# Patient Record
Sex: FEMALE | Race: WHITE | NOT HISPANIC OR LATINO | Employment: FULL TIME | URBAN - METROPOLITAN AREA
[De-identification: names, ages, dates, MRNs, and addresses within clinical notes are randomized per-mention and may not be internally consistent; named-entity substitution may affect disease eponyms.]

---

## 2017-01-05 ENCOUNTER — OFFICE VISIT (OUTPATIENT)
Dept: NEUROLOGY | Facility: CLINIC | Age: 62
End: 2017-01-05
Attending: PSYCHIATRY & NEUROLOGY
Payer: COMMERCIAL

## 2017-01-05 VITALS
BODY MASS INDEX: 20.66 KG/M2 | HEART RATE: 69 BPM | DIASTOLIC BLOOD PRESSURE: 80 MMHG | HEIGHT: 65 IN | WEIGHT: 124 LBS | SYSTOLIC BLOOD PRESSURE: 120 MMHG

## 2017-01-05 DIAGNOSIS — G35 MS (MULTIPLE SCLEROSIS) (H): Primary | ICD-10-CM

## 2017-01-05 PROCEDURE — 99212 OFFICE O/P EST SF 10 MIN: CPT | Mod: ZF

## 2017-01-05 ASSESSMENT — PAIN SCALES - GENERAL: PAINLEVEL: NO PAIN (0)

## 2017-01-05 NOTE — Clinical Note
1/5/2017      RE: Sejal Barroso  [address]  [place] [address]       Referral source: Established patient    Chief complaint: Multiple sclerosis    History of the Present Illness: MsRayshawn Barroso is a 61 year old woman who returns to the Multiple Sclerosis Clinic today for regularly scheduled follow-up after earlier MRI scans of the brain and cervical spine.    Her history is as per my previous notes. She initially came to attention after an episode of partial transverse myelitis associated with an enhancing lesion in the lower thoracic cord in 2008. Diagnosis of multiple sclerosis was confirmed after accumulation of new radiologic lesions on MRI in 2009. She had probable right trigeminal neuralgia in 2010, and most recently developed lancinating pain in the left side of the neck in 2015. These pain syndromes have been self-limited, and she has never been on disease modifying therapy for MS.    Today she relates that after our most recent visit in October 2015, she did have some recurrence of very brief paroxysms of intense pain (<5 seconds) in the left side of the neck. Currently this has improved, although she does still have some mild episodes of discomfort in this location.    Otherwise, she denies any new episodic changes in vision, balance, strength or sensation suggestive of new relapse of MS since she was last seen.     I reviewed the images from MRI scans of the brain and cervical spine performed on October 17, 2016 in the presence of the patient. Cervical MRI images are moderately degraded by motion artifact. There is no abnormal enhancement or definite cord signal abnormality of the cord parenchyma. Brain MRI demonstrates multiple periventricular and juxtacortical foci of T2 hyperintensity in the deep white matter of the cerebral hemispheres bilaterally. In comparison to previous MRI of 10-, there is new enhancement adjacent to the posterior aspect of the left lateral  ventricle, likely with some subtle enlargement of pre-existing T2 hyperintense focus in this location. No other significant changes are seen.    Physical examination:  Vitals: Blood pressure 120/80; pulse 69; height 1.65 mg; weight 56.2 kg  General: Well nourished woman who presents to the evaluation alone, awake and alert and in no acute distress.    Assessment/plan:    1. Multiple sclerosis  I discussed the implications of the MRI findings with the patient at length. The presence of an enhancing lesion suggests an active inflammatory demyelinating plaque at the time the study was completed. This likely correlates with an ongoing risk of clinical relapse of MS, although given the relative paucity of relapses since onset of her symptoms, it is not really possible to quantify the magnitude of this risk. I told her that initiating disease modifying therapy would likely reduce the risk of relapse, but it is also possible that she will not have additional relapses even if she does not start treatment.    I discussed several options for disease modifying therapy with the patient. Glatiramer acetate has the advantage of a very benign side effect profile and with the new 40 mg formulation, injection frequency is reduced to three times per week. As injection frequency has been a concern in the past, we could also consider the Plegridy formulation of interferon beta, which requires an injection every two weeks. Laboratory monitoring of liver function tests, blood counts, and thyroid function is required with this medication, and abnormalities (most commonly elevated liver function tests) are occasionally limiting to use. Flu-like side efffects (e.g., myalgias, headache and low grade fever) are common but typically improve with continued use and usually can be successfully mitigated by giving the injections at night and pre-medicating with naproxen.    Oral agents can also be considered. Dimethyl fumarate and fingolimod have  rarely been associated with serious opportunistic infections of the brain, including progressive multifocal leukoencephalopathy (PML). Teriflunomide has not been associated with such complications to date, but can be associated with abnormal liver function tests, peripheral neuropathy, nausea and hair thinning.    I have provided the patient with information regarding several products (Aubagio, Copaxone and Plegridy); she will consider this and let us know if she is desiring to start treatment. However, I also told her that as her functional status has remained stable for several years with a watchful waiting strategy, I do not think it unreasonable to continue this approach either.    She remains on vitamin D at 4000 units daily, and should continue this.     I will see plan to see her back in clinic in about 10 months with MRI scans of the brain and cervical spine prior to that appointment.    I spent 47 minutes with the patient in the office today, with greater than 50% of this time spent in counseling.    Marcelino Salazar MD   of Neurology  [facility]    Cc:  Sami Bishop MD (PCP)  Patient

## 2017-01-05 NOTE — PATIENT INSTRUCTIONS
1. We will provide you with information regarding several disease modifying treatments for MS: Copaxone, Plegridy, and Aubagio    2. Return to clinic in about 10 months with MRI scans of the brain and cervical spine prior to the appointment

## 2017-01-05 NOTE — MR AVS SNAPSHOT
After Visit Summary   1/5/2017    Sejal Barroso    MRN: [id number]           Patient Information     Date Of Birth          1955        Visit Information        Provider Department      1/5/2017 10:00 AM Marcelino Salazar MD [facility]        Today's Diagnoses     MS (multiple sclerosis) (H)    -  1       Care Instructions    1. We will provide you with information regarding several disease modifying treatments for MS: Copaxone, Plegridy, and Aubagio    2. Return to clinic in about 10 months with MRI scans of the brain and cervical spine prior to the appointment        Follow-ups after your visit        Your next 10 appointments already scheduled     Nov 02, 2017  9:00 AM   MR CERVICAL SPINE W/O & W CONTRAST with SHMRP1   [facility] ([facility])    [address] [place] [address] [phone]           Take your medicines as usual, unless your doctor tells you not to. Bring a list of your current medicines to your exam (including vitamins, minerals and over-the-counter drugs).  You will be given intravenous contrast for this exam. To prepare:   The day before your exam, drink extra fluids at least six 8-ounce glasses (unless your doctor tells you to restrict your fluids).   Have a blood test (creatinine test) within 30 days of your exam. Go to your clinic or Diagnostic Imaging Department for this test.  The MRI machine uses a strong magnet. Please wear clothes without metal (snaps, zippers). A sweatsuit works well, or we may give you a hospital gown.  Please remove any body piercings and hair extensions before you arrive. You will also remove watches, jewelry, hairpins, wallets, dentures, partial dental plates and hearing aids. You may wear contact lenses, and you may be able to wear your rings. We have a safe place to keep your personal items, but it is safer to leave them at home.   **IMPORTANT** THE INSTRUCTIONS BELOW ARE  ONLY FOR THOSE PATIENTS WHO HAVE BEEN TOLD THEY WILL RECEIVE SEDATION OR GENERAL ANESTHESIA DURING THEIR MRI PROCEDURE:  IF YOU WILL RECEIVE SEDATION (take medicine to help you relax during your exam):   You must get the medicine from your doctor before you arrive. Bring the medicine to the exam. Do not take it at home.   Arrive one hour early. Bring someone who can take you home after the test. Your medicine will make you sleepy. After the exam, you may not drive, take a bus or take a taxi by yourself.   No eating 8 hours before your exam. You may have clear liquids up until 4 hours before your exam. (Clear liquids include water, clear tea, black coffee and fruit juice without pulp.)  IF YOU WILL RECEIVE ANESTHESIA (be asleep for your exam):   Arrive 1 1/2 hours early. Bring someone who can take you home after the test. You may not drive, take a bus or take a taxi by yourself.   No eating 8 hours before your exam. You may have clear liquids up until 4 hours before your exam. (Clear liquids include water, clear tea, black coffee and fruit juice without pulp.)  Please call the Imaging Department at your exam site with any questions.            Nov 02, 2017 10:00 AM   MR BRAIN W/O & W CONTRAST with SHMRP1   [facility]    [address] [place] [address] [phone]           Take your medicines as usual, unless your doctor tells you not to. Bring a list of your current medicines to your exam (including vitamins, minerals and over-the-counter drugs).  You will be given intravenous contrast for this exam. To prepare:   The day before your exam, drink extra fluids at least six 8-ounce glasses (unless your doctor tells you to restrict your fluids).   Have a blood test (creatinine test) within 30 days of your exam. Go to your clinic or Diagnostic Imaging Department for this test.  The MRI machine uses a strong magnet. Please wear clothes without metal (snaps,  zippers). A sweatsuit works well, or we may give you a hospital gown.  Please remove any body piercings and hair extensions before you arrive. You will also remove watches, jewelry, hairpins, wallets, dentures, partial dental plates and hearing aids. You may wear contact lenses, and you may be able to wear your rings. We have a safe place to keep your personal items, but it is safer to leave them at home.   **IMPORTANT** THE INSTRUCTIONS BELOW ARE ONLY FOR THOSE PATIENTS WHO HAVE BEEN TOLD THEY WILL RECEIVE SEDATION OR GENERAL ANESTHESIA DURING THEIR MRI PROCEDURE:  IF YOU WILL RECEIVE SEDATION (take medicine to help you relax during your exam):   You must get the medicine from your doctor before you arrive. Bring the medicine to the exam. Do not take it at home.   Arrive one hour early. Bring someone who can take you home after the test. Your medicine will make you sleepy. After the exam, you may not drive, take a bus or take a taxi by yourself.   No eating 8 hours before your exam. You may have clear liquids up until 4 hours before your exam. (Clear liquids include water, clear tea, black coffee and fruit juice without pulp.)  IF YOU WILL RECEIVE ANESTHESIA (be asleep for your exam):   Arrive 1 1/2 hours early. Bring someone who can take you home after the test. You may not drive, take a bus or take a taxi by yourself.   No eating 8 hours before your exam. You may have clear liquids up until 4 hours before your exam. (Clear liquids include water, clear tea, black coffee and fruit juice without pulp.)  Please call the Imaging Department at your exam site with any questions.            Nov 09, 2017  9:30 AM   (Arrive by 9:15 AM)   Return Multiple Sclerosis with Marcelino Salazar MD   [facility] ([facility])    [address] [facility]  [address] [facility] [address] [phone]              Future tests that were ordered for you today     Open Future Orders         "Priority Expected Expires Ordered    MRI Brain w & w/o contrast Routine 10/12/2017 3/31/2018 1/5/2017    MRI Cervical spine w & w/o contrast Routine 10/12/2017 3/31/2018 1/5/2017            Who to contact     If you have questions or need follow up information about today's clinic visit or your schedule please contact [facility] directly at [phone].  Normal or non-critical lab and imaging results will be communicated to you by Fliptophart, letter or phone within 4 business days after the clinic has received the results. If you do not hear from us within 7 days, please contact the clinic through Client24 or phone. If you have a critical or abnormal lab result, we will notify you by phone as soon as possible.  Submit refill requests through Client24 or call your pharmacy and they will forward the refill request to us. Please allow 3 business days for your refill to be completed.          Additional Information About Your Visit        Client24 Information     Client24 gives you secure access to your electronic health record. If you see a primary care provider, you can also send messages to your care team and make appointments. If you have questions, please call your primary care clinic.  If you do not have a primary care provider, please call [phone] and they will assist you.        Care EveryWhere ID     This is your Care EveryWhere ID. This could be used by other organizations to access your [facility] medical records  [id number]        Your Vitals Were     Pulse Height BMI (Body Mass Index)             69 1.651 m (5' 5\") 20.63 kg/m2          Blood Pressure from Last 3 Encounters:   01/05/17 120/80   10/15/15 126/74   08/24/15 124/79    Weight from Last 3 Encounters:   01/05/17 56.246 kg (124 lb)   08/24/15 55.792 kg (123 lb)               Primary Care Provider Office Phone # Fax #    Sami Bishop [phone]       [facility] [address]   [place] " [phone]        Thank you!     Thank you for choosing [facility]  for your care. Our goal is always to provide you with excellent care. Hearing back from our patients is one way we can continue to improve our services. Please take a few minutes to complete the written survey that you may receive in the mail after your visit with us. Thank you!             Your Updated Medication List - Protect others around you: Learn how to safely use, store and throw away your medicines at www.disposemymeds.org.          This list is accurate as of: 1/5/17 11:36 AM.  Always use your most recent med list.                   Brand Name Dispense Instructions for use    calcium carbonate 500 MG tablet    OS-TRICIA 500 mg [place]. [place]     Take 500 mg by mouth 2 times daily       MULTIVITAMIN & MINERAL PO          VITAMIN D (CHOLECALCIFEROL) PO      Take 800 Units by mouth 2 times daily

## 2017-01-07 NOTE — PROGRESS NOTES
Referral source: Established patient    Chief complaint: Multiple sclerosis    History of the Present Illness: Ms. Sejal Barroso is a 61 year old woman who returns to the Multiple Sclerosis Clinic today for regularly scheduled follow-up after earlier MRI scans of the brain and cervical spine.    Her history is as per my previous notes. She initially came to attention after an episode of partial transverse myelitis associated with an enhancing lesion in the lower thoracic cord in 2008. Diagnosis of multiple sclerosis was confirmed after accumulation of new radiologic lesions on MRI in 2009. She had probable right trigeminal neuralgia in 2010, and most recently developed lancinating pain in the left side of the neck in 2015. These pain syndromes have been self-limited, and she has never been on disease modifying therapy for MS.    Today she relates that after our most recent visit in October 2015, she did have some recurrence of very brief paroxysms of intense pain (<5 seconds) in the left side of the neck. Currently this has improved, although she does still have some mild episodes of discomfort in this location.    Otherwise, she denies any new episodic changes in vision, balance, strength or sensation suggestive of new relapse of MS since she was last seen.     I reviewed the images from MRI scans of the brain and cervical spine performed on October 17, 2016 in the presence of the patient. Cervical MRI images are moderately degraded by motion artifact. There is no abnormal enhancement or definite cord signal abnormality of the cord parenchyma. Brain MRI demonstrates multiple periventricular and juxtacortical foci of T2 hyperintensity in the deep white matter of the cerebral hemispheres bilaterally. In comparison to previous MRI of 10-, there is new enhancement adjacent to the posterior aspect of the left lateral ventricle, likely with some subtle enlargement of pre-existing T2 hyperintense focus in this  location. No other significant changes are seen.    Physical examination:  Vitals: Blood pressure 120/80; pulse 69; height 1.65 mg; weight 56.2 kg  General: Well nourished woman who presents to the evaluation alone, awake and alert and in no acute distress.    Assessment/plan:    1. Multiple sclerosis  I discussed the implications of the MRI findings with the patient at length. The presence of an enhancing lesion suggests an active inflammatory demyelinating plaque at the time the study was completed. This likely correlates with an ongoing risk of clinical relapse of MS, although given the relative paucity of relapses since onset of her symptoms, it is not really possible to quantify the magnitude of this risk. I told her that initiating disease modifying therapy would likely reduce the risk of relapse, but it is also possible that she will not have additional relapses even if she does not start treatment.    I discussed several options for disease modifying therapy with the patient. Glatiramer acetate has the advantage of a very benign side effect profile and with the new 40 mg formulation, injection frequency is reduced to three times per week. As injection frequency has been a concern in the past, we could also consider the Plegridy formulation of interferon beta, which requires an injection every two weeks. Laboratory monitoring of liver function tests, blood counts, and thyroid function is required with this medication, and abnormalities (most commonly elevated liver function tests) are occasionally limiting to use. Flu-like side efffects (e.g., myalgias, headache and low grade fever) are common but typically improve with continued use and usually can be successfully mitigated by giving the injections at night and pre-medicating with naproxen.    Oral agents can also be considered. Dimethyl fumarate and fingolimod have rarely been associated with serious opportunistic infections of the brain, including  progressive multifocal leukoencephalopathy (PML). Teriflunomide has not been associated with such complications to date, but can be associated with abnormal liver function tests, peripheral neuropathy, nausea and hair thinning.    I have provided the patient with information regarding several products (Aubagio, Copaxone and Plegridy); she will consider this and let us know if she is desiring to start treatment. However, I also told her that as her functional status has remained stable for several years with a watchful waiting strategy, I do not think it unreasonable to continue this approach either.    She remains on vitamin D at 4000 units daily, and should continue this.     I will see plan to see her back in clinic in about 10 months with MRI scans of the brain and cervical spine prior to that appointment.    I spent 47 minutes with the patient in the office today, with greater than 50% of this time spent in counseling.

## 2017-07-01 ENCOUNTER — HEALTH MAINTENANCE LETTER (OUTPATIENT)
Age: 62
End: 2017-07-01

## 2017-11-02 ENCOUNTER — HOSPITAL ENCOUNTER (OUTPATIENT)
Dept: MRI IMAGING | Facility: CLINIC | Age: 62
Discharge: HOME | End: 2017-11-02
Attending: PSYCHIATRY & NEUROLOGY | Admitting: PSYCHIATRY & NEUROLOGY
Payer: COMMERCIAL

## 2017-11-02 ENCOUNTER — HOSPITAL ENCOUNTER (OUTPATIENT)
Dept: MRI IMAGING | Facility: CLINIC | Age: 62
End: 2017-11-02
Attending: PSYCHIATRY & NEUROLOGY
Payer: COMMERCIAL

## 2017-11-02 DIAGNOSIS — G35 MS (MULTIPLE SCLEROSIS) (H): ICD-10-CM

## 2017-11-02 PROCEDURE — 70553 MRI BRAIN STEM W/O & W/DYE: CPT

## 2017-11-02 PROCEDURE — 25000128 H RX IP 250 OP 636: Performed by: PSYCHIATRY & NEUROLOGY

## 2017-11-02 PROCEDURE — A9585 GADOBUTROL INJECTION: HCPCS | Performed by: PSYCHIATRY & NEUROLOGY

## 2017-11-02 PROCEDURE — 72156 MRI NECK SPINE W/O & W/DYE: CPT

## 2017-11-02 RX ORDER — GADOBUTROL 604.72 MG/ML
6 INJECTION INTRAVENOUS ONCE
Status: COMPLETED | OUTPATIENT
Start: 2017-11-02 | End: 2017-11-02

## 2017-11-02 RX ADMIN — GADOBUTROL 6 ML: 604.72 INJECTION INTRAVENOUS at 10:34

## 2017-11-09 ENCOUNTER — OFFICE VISIT (OUTPATIENT)
Dept: NEUROLOGY | Facility: CLINIC | Age: 62
End: 2017-11-09
Attending: PSYCHIATRY & NEUROLOGY
Payer: COMMERCIAL

## 2017-11-09 VITALS
WEIGHT: 128 LBS | HEART RATE: 68 BPM | SYSTOLIC BLOOD PRESSURE: 116 MMHG | DIASTOLIC BLOOD PRESSURE: 67 MMHG | BODY MASS INDEX: 21.33 KG/M2 | HEIGHT: 65 IN

## 2017-11-09 DIAGNOSIS — G35 MULTIPLE SCLEROSIS (H): ICD-10-CM

## 2017-11-09 DIAGNOSIS — G35 MULTIPLE SCLEROSIS (H): Primary | ICD-10-CM

## 2017-11-09 DIAGNOSIS — R26.9 GAIT DISTURBANCE: ICD-10-CM

## 2017-11-09 PROCEDURE — 40000809 ZZH STATISTIC NO DOCUMENTATION TO SUPPORT CHARGE

## 2017-11-09 PROCEDURE — 82306 VITAMIN D 25 HYDROXY: CPT | Performed by: PSYCHIATRY & NEUROLOGY

## 2017-11-09 PROCEDURE — 36415 COLL VENOUS BLD VENIPUNCTURE: CPT | Performed by: PSYCHIATRY & NEUROLOGY

## 2017-11-09 ASSESSMENT — PAIN SCALES - GENERAL: PAINLEVEL: MILD PAIN (2)

## 2017-11-09 NOTE — MR AVS SNAPSHOT
After Visit Summary   11/9/2017    Sejal Barroso    MRN: [id number]           Patient Information     Date Of Birth          1955        Visit Information        Provider Department      11/9/2017 9:30 AM Marcelino Salazar MD [facility]        Today's Diagnoses     Multiple sclerosis (H)    -  1      Care Instructions    1. Blood test (vitamin D level) today    2. Return to clinic in one year          Follow-ups after your visit        Follow-up notes from your care team     Return in about 1 year (around 11/9/2018).      Your next 10 appointments already scheduled     Nov 09, 2017 11:45 AM CST   LAB with  LAB   [facility] ([facility])    [address] [place] [address] [phone]           Please do not eat 10-12 hours before your appointment if you are coming in fasting for labs on lipids, cholesterol, or glucose (sugar). This does not apply to pregnant women. Water, hot tea and black coffee (with nothing added) are okay. Do not drink other fluids, diet soda or chew gum.            Nov 08, 2018  9:00 AM CST   (Arrive by 8:45 AM)   Return Multiple Sclerosis with Marcelino Salazar MD   [facility] ([facility])    [address] [place] [address] [phone]              Future tests that were ordered for you today     Open Future Orders        Priority Expected Expires Ordered    Vitamin D Deficiency Screening Routine 11/9/2017 1/25/2018 11/9/2017            Who to contact     If you have questions or need follow up information about today's clinic visit or your schedule please contact [facility] directly at [phone].  Normal or non-critical lab and imaging results will be communicated to you by MyChart, letter or phone within 4 business days after the clinic has received the results. If you do not hear from us within 7  "days, please contact the clinic through Verdezyne or phone. If you have a critical or abnormal lab result, we will notify you by phone as soon as possible.  Submit refill requests through Verdezyne or call your pharmacy and they will forward the refill request to us. Please allow 3 business days for your refill to be completed.          Additional Information About Your Visit        [employer] Information     Verdezyne gives you secure access to your electronic health record. If you see a primary care provider, you can also send messages to your care team and make appointments. If you have questions, please call your primary care clinic.  If you do not have a primary care provider, please call [phone] and they will assist you.        Care EveryWhere ID     This is your Care EveryWhere ID. This could be used by other organizations to access your [facility] medical records  [id number]        Your Vitals Were     Pulse Height BMI (Body Mass Index)             68 1.651 m (5' 5\") 21.3 kg/m2          Blood Pressure from Last 3 Encounters:   11/09/17 116/67   01/05/17 120/80   10/15/15 126/74    Weight from Last 3 Encounters:   11/09/17 58.1 kg (128 lb)   01/05/17 56.2 kg (124 lb)   08/24/15 55.8 kg (123 lb)               Primary Care Provider Office Phone # Fax #    Sami Bishop [phone]       [facility] [address] [address]        Equal Access to Services     [facility] LANCE AH: Hadii aad ku hadasho Soomaali, waaxda luqadaha, qaybta kaalmada adeegyada, karlos hodge haycelesten sadia senior . So [facility] [phone].    ATENCIÓN: Si habla español, tiene a taylor disposición servicios gratuitos de asistencia lingüística. Llame al [phone].    We comply with applicable federal civil rights laws and [place] laws. We do not discriminate on the basis of race, color, national origin, age, disability, sex, sexual orientation, or gender identity.            Thank you!     " Thank you for choosing [facility]  for your care. Our goal is always to provide you with excellent care. Hearing back from our patients is one way we can continue to improve our services. Please take a few minutes to complete the written survey that you may receive in the mail after your visit with us. Thank you!             Your Updated Medication List - Protect others around you: Learn how to safely use, store and throw away your medicines at www.disposemymeds.org.          This list is accurate as of: 11/9/17 10:41 AM.  Always use your most recent med list.                   Brand Name Dispense Instructions for use Diagnosis    calcium carbonate 1250 MG tablet    OS-TRICIA 500 mg [place]. [place]     Take 500 mg by mouth 2 times daily        MULTIVITAMIN & MINERAL PO           VITAMIN D (CHOLECALCIFEROL) PO      Take 800 Units by mouth 2 times daily

## 2017-11-09 NOTE — NURSING NOTE
Chief Complaint   Patient presents with     RECHECK     Multiple Sclerosis    Michelle Weems CMA

## 2017-11-09 NOTE — PROGRESS NOTES
"Referral source: Established patient     Chief complaint: Multiple sclerosis      History of the Present Illness: Ms. Sejal Barroso is a 62-year-old woman who returns to the [facility]clerosis [facility] today for regularly scheduled follow-up after earlier MRI scans of the brain and cervical spine.      The patient's history is as per my previous notes.  In 2008, she developed partial transverse myelitis with an enhancing lesion in the lower thoracic cord that has been associated with residual right leg weakness.  She has had a couple of episodes of new onset of lancinating neuropathic pain since that time, most recently affecting the left side of the neck in 2015.   She has, per her choice, never been on disease modifying therapies for multiple sclerosis.     Last year, we were somewhat concerned as there was an area of enhancement with gadolinium contrast visible on brain MRI, and we asked the patient to return with follow-up imaging this year.      Today, the patient indicates that she is \"pretty much the same, nothing major\".  She does note some residual left-sided neck discomfort that is usually not particularly severe.  She continues to have fatigable weakness of the right leg.  She relates that she will start limping after 2-3 blocks and she has noticed that specifically her right hip flexors and ankle dorsiflexors weaken with continued exertion.  She has noted that she is no longer able to do complex dance steps because her right leg does not seem to want to obey what her brain is telling it to do.      As regards new events, she did have a fall in the bathroom about 6 months ago and unfortunately dislocated the fifth digit of her left hand.  She relates that she was wearing tights and slipped on the bathroom floor and does not think that this was related to her underlying gait and balance problems.  Additionally, 2-3 months ago the patient noted that she was having some numbness of the right foot that gradually " spread to involve the right leg.  This has now resolved.      Past Medical History:  1. Migraine.   2. Osteopenia.   3. Status post tonsillectomy.   4. Status post  section times three.      PHYSICAL EXAMINATION:   VITAL SIGNS:  Blood pressure 116/67; pulse 68; weight 58.1 kg; height 1.65 meters.   GENERAL:  Well-nourished woman who presents to the examination alone, awake and alert and in no acute distress.   NEUROLOGIC:     MENTAL STATUS:  Alert and oriented times four.   CRANIAL NERVES:  Visual fields are full to confrontation.  Extraocular movements are intact with no internuclear ophthalmoplegia.  Facial strength is normal.  Hearing is normal for conversational purposes.  Palate elevation and tongue protrusion are normal.   POWER:  Strength is normal (5/5) in the following muscles/groups bilaterally:  Deltoids, biceps, triceps, wrist extensors, finger extensors, first dorsal interosseous.  The right hip flexors, hamstrings and anterior tibialis all grade 4/5, and are normal on the left.   REFLEXES:  Reflexes are symmetric and within normal limits at biceps, triceps and brachioradialis.  Reflexes are increased at the right knee and ankle as compared to the left.   MOTOR/CEREBELLAR:  There are no tremors, myoclonus or other abnormal movements.  She does not have any marked increase in tone in the limbs to passive range of motion.  There is no appendicular ataxia on finger-to-nose testing and rapid alternating movements are within normal limits in the extremities.  There is no pronator drift in the arms.   GAIT:  The patient is able to ambulate on a flat, level surface without difficulty.  Heel, toe and tandem walking were all intact.      Assessment/plan:    1.  Relapsing-remitting multiple sclerosis  The patient did have an episode of transient sensory symptoms affecting the right foot and leg earlier this summer.  It is conceivable that this could have been due to new inflammation in the thoracic cord,  which was not imaged on the recent MRI scans, although it is also possible that these were remote sequelae of her chronic lesion.     The patient has, per her choice, never been on disease modifying therapies for MS.  I told her that I did not see anything on the MRI today that would make me more concerned about her not being on treatment.  She continues to prefer not to be on medication and she is reaching an age at which the inflammatory component of multiple sclerosis typically declines markedly, and hopefully this will be the case for her as well.      We will check a vitamin D level today.  Currently, she is taking about 5000 units of vitamin D daily from all sources.     I will plan to see her back in the clinic in 1 year.      2.  Gait disturbance  The patient inquires if there is any intervention that would be helpful for the fatigable weakness in the right leg.  I discussed with her that this is due to the underlying lesion in the spinal cord and unfortunately there is no physical therapy intervention or medication that will resolve the underlying problem.  I discussed with her that physical therapy could be helpful to make sure that she is doing everything she should be from a standpoint of balance and reducing risk of falls as well as working on conditioning.  She is staying physically active, currently using a stationary bicycle for exercise.  At present, she does not necessarily feel that she needs a physical therapy evaluation, but we can obtain this at any point in the future if she feels that this would be helpful.         SAIGE ALVARES MD             D: 2017 10:50   T: 2017 12:02   MT: tb      Name:     JOHN PALMER   MRN:      [id number]        Account:      [id number]   :      1955           Service Date: 2017      Document: [id number]

## 2017-11-09 NOTE — LETTER
"11/9/2017      RE: Sejal Barroso  [address]  [place] [address]       Referral source: Established patient     Chief complaint: Multiple sclerosis      History of the Present Illness: Ms. Sejal Barroso is a 62-year-old woman who returns to the [facility] Sclerosis Clinic today for regularly scheduled follow-up after earlier MRI scans of the brain and cervical spine.      The patient's history is as per my previous notes.  In 2008, she developed partial transverse myelitis with an enhancing lesion in the lower thoracic cord that has been associated with residual right leg weakness.  She has had a couple of episodes of new onset of lancinating neuropathic pain since that time, most recently affecting the left side of the neck in 2015.   She has, per her choice, never been on disease modifying therapies for multiple sclerosis.     Last year, we were somewhat concerned as there was an area of enhancement with gadolinium contrast visible on brain MRI, and we asked the patient to return with follow-up imaging this year.      Today, the patient indicates that she is \"pretty much the same, nothing major\".  She does note some residual left-sided neck discomfort that is usually not particularly severe.  She continues to have fatigable weakness of the right leg.  She relates that she will start limping after 2-3 blocks and she has noticed that specifically her right hip flexors and ankle dorsiflexors weaken with continued exertion.  She has noted that she is no longer able to do complex dance steps because her right leg does not seem to want to obey what her brain is telling it to do.      As regards new events, she did have a fall in the bathroom about 6 months ago and unfortunately dislocated the fifth digit of her left hand.  She relates that she was wearing tights and slipped on the bathroom floor and does not think that this was related to her underlying gait and balance problems.  Additionally, 2-3 " months ago the patient noted that she was having some numbness of the right foot that gradually spread to involve the right leg.  This has now resolved.      Past Medical History:  1. Migraine.   2. Osteopenia.   3. Status post tonsillectomy.   4. Status post  section times three.      PHYSICAL EXAMINATION:   VITAL SIGNS:  Blood pressure 116/67; pulse 68; weight 58.1 kg; height 1.65 meters.   GENERAL:  Well-nourished woman who presents to the examination alone, awake and alert and in no acute distress.   NEUROLOGIC:     MENTAL STATUS:  Alert and oriented times four.   CRANIAL NERVES:  Visual fields are full to confrontation.  Extraocular movements are intact with no internuclear ophthalmoplegia.  Facial strength is normal.  Hearing is normal for conversational purposes.  Palate elevation and tongue protrusion are normal.   POWER:  Strength is normal (5/5) in the following muscles/groups bilaterally:  Deltoids, biceps, triceps, wrist extensors, finger extensors, first dorsal interosseous.  The right hip flexors, hamstrings and anterior tibialis all grade 4/5, and are normal on the left.   REFLEXES:  Reflexes are symmetric and within normal limits at biceps, triceps and brachioradialis.  Reflexes are increased at the right knee and ankle as compared to the left.   MOTOR/CEREBELLAR:  There are no tremors, myoclonus or other abnormal movements.  She does not have any marked increase in tone in the limbs to passive range of motion.  There is no appendicular ataxia on finger-to-nose testing and rapid alternating movements are within normal limits in the extremities.  There is no pronator drift in the arms.   GAIT:  The patient is able to ambulate on a flat, level surface without difficulty.  Heel, toe and tandem walking were all intact.      Assessment/plan:    1.  Relapsing-remitting multiple sclerosis  The patient did have an episode of transient sensory symptoms affecting the right foot and leg earlier this  summer.  It is conceivable that this could have been due to new inflammation in the thoracic cord, which was not imaged on the recent MRI scans, although it is also possible that these were remote sequelae of her chronic lesion.     The patient has, per her choice, never been on disease modifying therapies for MS.  I told her that I did not see anything on the MRI today that would make me more concerned about her not being on treatment.  She continues to prefer not to be on medication and she is reaching an age at which the inflammatory component of multiple sclerosis typically declines markedly, and hopefully this will be the case for her as well.      We will check a vitamin D level today.  Currently, she is taking about 5000 units of vitamin D daily from all sources.     I will plan to see her back in the clinic in 1 year.      2.  Gait disturbance  The patient inquires if there is any intervention that would be helpful for the fatigable weakness in the right leg.  I discussed with her that this is due to the underlying lesion in the spinal cord and unfortunately there is no physical therapy intervention or medication that will resolve the underlying problem.  I discussed with her that physical therapy could be helpful to make sure that she is doing everything she should be from a standpoint of balance and reducing risk of falls as well as working on conditioning.  She is staying physically active, currently using a stationary bicycle for exercise.  At present, she does not necessarily     feel that she needs a physical therapy evaluation, but we can obtain this at any point in the future if she feels that this would be helpful.     Marcelino Salazar MD   of Neurology  [facility]    Cc:  Sami Bishop MD (PCP)  Patient

## 2017-11-10 LAB — DEPRECATED CALCIDIOL+CALCIFEROL SERPL-MC: 63 UG/L (ref 20–75)

## 2017-11-13 ENCOUNTER — MYC MEDICAL ADVICE (OUTPATIENT)
Dept: NEUROLOGY | Facility: CLINIC | Age: 62
End: 2017-11-13

## 2018-11-08 ENCOUNTER — OFFICE VISIT (OUTPATIENT)
Dept: NEUROLOGY | Facility: CLINIC | Age: 63
End: 2018-11-08
Attending: PSYCHIATRY & NEUROLOGY
Payer: COMMERCIAL

## 2018-11-08 VITALS
HEART RATE: 74 BPM | HEIGHT: 65 IN | BODY MASS INDEX: 20.83 KG/M2 | DIASTOLIC BLOOD PRESSURE: 66 MMHG | OXYGEN SATURATION: 100 % | WEIGHT: 125 LBS | SYSTOLIC BLOOD PRESSURE: 113 MMHG

## 2018-11-08 DIAGNOSIS — G35 MULTIPLE SCLEROSIS (H): Primary | ICD-10-CM

## 2018-11-08 DIAGNOSIS — G35 MULTIPLE SCLEROSIS (H): ICD-10-CM

## 2018-11-08 LAB — DEPRECATED CALCIDIOL+CALCIFEROL SERPL-MC: 55 UG/L (ref 20–75)

## 2018-11-08 PROCEDURE — G0463 HOSPITAL OUTPT CLINIC VISIT: HCPCS

## 2018-11-08 PROCEDURE — 82306 VITAMIN D 25 HYDROXY: CPT | Performed by: PSYCHIATRY & NEUROLOGY

## 2018-11-08 PROCEDURE — 36415 COLL VENOUS BLD VENIPUNCTURE: CPT | Performed by: PSYCHIATRY & NEUROLOGY

## 2018-11-08 RX ORDER — ERGOCALCIFEROL (VITAMIN D2) 10 MCG
TABLET ORAL
COMMUNITY
Start: 2010-05-21 | End: 2022-09-16

## 2018-11-08 ASSESSMENT — PAIN SCALES - GENERAL: PAINLEVEL: NO PAIN (0)

## 2018-11-08 NOTE — MR AVS SNAPSHOT
After Visit Summary   11/8/2018    Sejal Barroso    MRN: [id number]           Patient Information     Date Of Birth          1955        Visit Information        Provider Department      11/8/2018 9:00 AM Marcelino Salazar MD [facility]        Today's Diagnoses     Multiple sclerosis (H)    -  1      Care Instructions    1. Blood test (vitamin D ) today    2. Return to clinic in one year          Follow-ups after your visit        Follow-up notes from your care team     Return in about 1 year (around 11/8/2019).      Your next 10 appointments already scheduled     Nov 08, 2018 10:30 AM CST   LAB with  LAB   [facility] ([facility])    [address]  [facility] [address] [phone]           Please do not eat 10-12 hours before your appointment if you are coming in fasting for labs on lipids, cholesterol, or glucose (sugar). This does not apply to pregnant women. Water, hot tea and black coffee (with nothing added) are okay. Do not drink other fluids, diet soda or chew gum.            Nov 07, 2019  9:30 AM CST   (Arrive by 9:15 AM)   Return Multiple Sclerosis with Marcelino Salazar MD   [facility] ([facility])    [address] [address] [phone]              Future tests that were ordered for you today     Open Future Orders        Priority Expected Expires Ordered    Vitamin D Deficiency Screening Routine 11/8/2018 12/31/2018 11/8/2018            Who to contact     If you have questions or need follow up information about today's clinic visit or your schedule please contact [facility] directly at [phone].  Normal or non-critical lab and imaging results will be communicated to you by MyChart, letter or phone within 4 business days after the clinic has received the results. If you do not hear from us within 7  "days, please contact the clinic through [employer] or phone. If you have a critical or abnormal lab result, we will notify you by phone as soon as possible.  Submit refill requests through [employer] or call your pharmacy and they will forward the refill request to us. Please allow 3 business days for your refill to be completed.          Additional Information About Your Visit        [employer] Information     [employer] gives you secure access to your electronic health record. If you see a primary care provider, you can also send messages to your care team and make appointments. If you have questions, please call your primary care clinic.  If you do not have a primary care provider, please call [phone] and they will assist you.        Care EveryWhere ID     This is your Care EveryWhere ID. This could be used by other organizations to access your [place] medical records  [id number]        Your Vitals Were     Pulse Height Pulse Oximetry BMI (Body Mass Index)          74 1.651 m (5' 5\") 100% 20.8 kg/m2         Blood Pressure from Last 3 Encounters:   11/08/18 113/66   11/09/17 116/67   01/05/17 120/80    Weight from Last 3 Encounters:   11/08/18 56.7 kg (125 lb)   11/09/17 58.1 kg (128 lb)   01/05/17 56.2 kg (124 lb)               Primary Care Provider Office Phone # Fax #    Sami Bishop [phone]       [facility] [address] [address]        Equal Access to Services     NESTOR LUCAS AH: Hadii aad ku hadasho Soomaali, waaxda luqadaha, qaybta kaalmada adeegyada, waxay idiin haycelesten sadia senior . So [facility] [phone].    ATENCIÓN: Si habla español, tiene a taylor disposición servicios gratuitos de asistencia lingüística. Llame al [phone].    We comply with applicable federal civil rights laws and [place] laws. We do not discriminate on the basis of race, color, national origin, age, disability, sex, sexual orientation, or gender identity.          "   Thank you!     Thank you for choosing [facility]  for your care. Our goal is always to provide you with excellent care. Hearing back from our patients is one way we can continue to improve our services. Please take a few minutes to complete the written survey that you may receive in the mail after your visit with us. Thank you!             Your Updated Medication List - Protect others around you: Learn how to safely use, store and throw away your medicines at www.disposemymeds.org.          This list is accurate as of 11/8/18 10:09 AM.  Always use your most recent med list.                   Brand Name Dispense Instructions for use Diagnosis    calcium carbonate 500 mg (elemental) 500 MG tablet    OS-TRICIA     Take 500 mg by mouth 2 times daily        MULTIVITAMIN & MINERAL PO           VITAMIN D (CHOLECALCIFEROL) PO      Take 800 Units by mouth 2 times daily        Vitamin D2 400 units Tabs

## 2018-11-08 NOTE — NURSING NOTE
Chief Complaint   Patient presents with     RECHECK     ms 12 MONTH FOLLOW UP       Preventive Care:    Breast Cancer Screening: During our visit today, we discussed that it is recommended you receive breast cancer screening. Please call or make an appointment with your primary care provider to discuss this with them. You may also call the [facility] scheduling line ([phone]) to set up a mammography appointment at the [facility] within the [facility].      Larisa Leiva MA

## 2018-11-08 NOTE — LETTER
"2018      RE: Sejal Barroso  [address]  [place] [address]     Referral source: Established patient     Chief complaint: Multiple sclerosis.      History of the Present Illness: Ms. Sejal Barroso is a 63-year-old right-handed woman who returns to the Multiple Sclerosis Clinic today for regularly scheduled followup.      The patient's history is as per my previous notes.  She initially developed symptoms of demyelinating disease in  when she presented with an enhancing lesion in the lower thoracic cord resulting in persistent right leg weakness.  Subsequently, she has had a couple of episodes of new onset lancinating neuropathic pain affecting the left side of the neck in  and more remotely with an episode of trigeminal neuralgia.  MRI imaging was last performed in  and demonstrated stability at that time, with the most recent evidence of active inflammatory demyelination being an enhancing lesion on MRI imaging performed in .      Today, the patient continues to deny any new episodic changes in vision, balance, strength or sensation suggestive of new relapse of multiple sclerosis since she was last seen.  Symptomatically, she relates that her balance may be getting a little bit worse over time.  She relates that her right leg starts to \"limp\" more noticeably after walking about 2 blocks.  She is not able to run.     She is not having any pain in the face at present.  She does still have some mild residual discomfort in the left side of the neck, although no longer experiencing severe lancinating pain in that location.      She denies any falls in the past year.      Past Medical History:  1.  Migraine.   2.  Osteopenia.   3.  Status post tonsillectomy.   4.  Status post  section x3.      PHYSICAL EXAMINATION:   VITAL SIGNS:  Blood pressure 113/66; pulse 74; weight 56.7 kg; height 1.65 meters.   GENERAL:  Well-nourished woman who presents to the examination alone, awake " and alert and in no acute distress.   NEUROLOGIC:   MENTAL STATUS:  Alert and oriented times four.  CRANIAL NERVES:  Visual fields are full to confrontation.  On testing of extraocular movements, there is some saccadic intrusion into smooth pursuit, but movement is intact in all directions of gaze and there is no definite internuclear ophthalmoplegia.  Facial strength is normal.  Hearing is normal for conversational purposes.  Palate elevation and tongue protrusion are normal.   POWER:  Strength is normal (5/5) in the following muscles/groups bilaterally:  Deltoids, biceps, triceps, wrist extensors, finger extensors and first dorsal interosseous.  Hip flexors grade 4/5 on the right and there is trace weakness on the left.  Hamstrings and anterior tibialis are 4+ on the right and normal on the left.   REFLEXES:  Reflexes are roughly symmetric and within normal limits at biceps, triceps and brachioradialis.  Reflexes are increased in the right leg as compared to the left, particularly at the ankle.   MOTOR/CEREBELLAR:  There are no tremors, myoclonus or other abnormal movements seen.  Tone is grossly normal in the limbs.  There is no appendicular ataxia on finger-to-nose testing and rapid alternating movements are within normal limits in the extremities.  There is no pronator drift in the arms.   GAIT:  The patient is able to ambulate on a flat, level surface without difficulty.  She can walk on heels and toes.  Tandem gait is mildly to moderately impaired for age.      Assessment/plan:    1.  Multiple sclerosis  Clinically, there has been no evidence of recurrent active inflammatory demyelination over the past 2 years.  There may be an element of early, mild motor progression given her note of slightly worsening imbalance and diminishing exercise tolerance over time.  Fortunately, at present, she has minimal functional limitation.      I discussed follow-up imaging with the patient.  Per her choice, she has never  "been on disease modifying therapies for multiple sclerosis.  She indicates that she would not be likely to change this decision at this time due to mild changes on MRI.  Accordingly, I am comfortable with deferring imaging next year as well, although we will likely recommend interval follow-up image in 2020 even if she is otherwise clinically stable.  In general, in patients in the seventh decade of life, the active inflammatory component of multiple sclerosis is frequently tending to \"burn out,\" although the likelihood of this in her case is somewhat difficult to interpret given atypically late onset of relapsing multiple sclerosis.  Nevertheless, I do think that the plan for continued observation is reasonable at this time.      We will check a vitamin D level today and I will advise the patient on supplementation as needed to maintain her level within the goal range of 60-80 mcg per liter.  She is currently taking 6000 units of vitamin D daily from all sources.  I will see her back for a review in 1 year, or sooner if she has new symptoms that are of concern to her.     Marcelino Salazar MD   of Neurology  [facility]    Cc:  Sami Bishop MD (PCP)  Patient    "

## 2018-11-14 NOTE — PROGRESS NOTES
"Date of service: 2018     Referral source: Established patient     Chief complaint: Multiple sclerosis.      History of the Present Illness: Ms. Sejal Barroso is a 63-year-old right-handed woman who returns to the [facility] today for regularly scheduled followup.      The patient's history is as per my previous notes.  She initially developed symptoms of demyelinating disease in  when she presented with an enhancing lesion in the lower thoracic cord resulting in persistent right leg weakness.  Subsequently, she has had a couple of episodes of new onset lancinating neuropathic pain affecting the left side of the neck in  and more remotely with an episode of trigeminal neuralgia.  MRI imaging was last performed in  and demonstrated stability at that time, with the most recent evidence of active inflammatory demyelination being an enhancing lesion on MRI imaging performed in .      Today, the patient continues to deny any new episodic changes in vision, balance, strength or sensation suggestive of new relapse of multiple sclerosis since she was last seen.  Symptomatically, she relates that her balance may be getting a little bit worse over time.  She relates that her right leg starts to \"limp\" more noticeably after walking about 2 blocks.  She is not able to run.     She is not having any pain in the face at present.  She does still have some mild residual discomfort in the left side of the neck, although no longer experiencing severe lancinating pain in that location.      She denies any falls in the past year.      Past Medical History:  1.  Migraine.   2.  Osteopenia.   3.  Status post tonsillectomy.   4.  Status post  section x3.      PHYSICAL EXAMINATION:   VITAL SIGNS:  Blood pressure 113/66; pulse 74; weight 56.7 kg; height 1.65 meters.   GENERAL:  Well-nourished woman who presents to the examination alone, awake and alert and in no acute distress.   NEUROLOGIC: "   MENTAL STATUS:  Alert and oriented times four.  CRANIAL NERVES:  Visual fields are full to confrontation.  On testing of extraocular movements, there is some saccadic intrusion into smooth pursuit, but movement is intact in all directions of gaze and there is no definite internuclear ophthalmoplegia.  Facial strength is normal.  Hearing is normal for conversational purposes.  Palate elevation and tongue protrusion are normal.   POWER:  Strength is normal (5/5) in the following muscles/groups bilaterally:  Deltoids, biceps, triceps, wrist extensors, finger extensors and first dorsal interosseous.  Hip flexors grade 4/5 on the right and there is trace weakness on the left.  Hamstrings and anterior tibialis are 4+ on the right and normal on the left.   REFLEXES:  Reflexes are roughly symmetric and within normal limits at biceps, triceps and brachioradialis.  Reflexes are increased in the right leg as compared to the left, particularly at the ankle.   MOTOR/CEREBELLAR:  There are no tremors, myoclonus or other abnormal movements seen.  Tone is grossly normal in the limbs.  There is no appendicular ataxia on finger-to-nose testing and rapid alternating movements are within normal limits in the extremities.  There is no pronator drift in the arms.   GAIT:  The patient is able to ambulate on a flat, level surface without difficulty.  She can walk on heels and toes.  Tandem gait is mildly to moderately impaired for age.      Assessment/plan:    1.  Multiple sclerosis  Clinically, there has been no evidence of recurrent active inflammatory demyelination over the past 2 years.  There may be an element of early, mild motor progression given her note of slightly worsening imbalance and diminishing exercise tolerance over time.  Fortunately, at present, she has minimal functional limitation.      I discussed follow-up imaging with the patient.  Per her choice, she has never been on disease modifying therapies for multiple  "sclerosis.  She indicates that she would not be likely to change this decision at this time due to mild changes on MRI.  Accordingly, I am comfortable with deferring imaging next year as well, although we will likely recommend interval follow-up image in 2020 even if she is otherwise clinically stable.  In general, in patients in the seventh decade of life, the active inflammatory component of multiple sclerosis is frequently tending to \"burn out,\" although the likelihood of this in her case is somewhat difficult to interpret given atypically late onset of relapsing multiple sclerosis.  Nevertheless, I do think that the plan for continued observation is reasonable at this time.      We will check a vitamin D level today and I will advise the patient on supplementation as needed to maintain her level within the goal range of 60-80 mcg per liter.  She is currently taking 6000 units of vitamin D daily from all sources.  I will see her back for a review in 1 year, or sooner if she has new symptoms that are of concern to her.      MD SAIGE Juan MD             D: 2018   T: 2018   MT: AKA      Name:     JOHN PALMER   MRN:      [id number]        Account:      [id number]   :      1955           Service Date: 2018      Document: [id number]      "

## 2018-11-28 ENCOUNTER — HOSPITAL ENCOUNTER (OUTPATIENT)
Dept: MAMMOGRAPHY | Facility: CLINIC | Age: 63
Discharge: HOME | End: 2018-11-28
Attending: OBSTETRICS & GYNECOLOGY | Admitting: OBSTETRICS & GYNECOLOGY
Payer: COMMERCIAL

## 2018-11-28 ENCOUNTER — HOSPITAL ENCOUNTER (OUTPATIENT)
Dept: BONE DENSITY | Facility: CLINIC | Age: 63
End: 2018-11-28
Attending: OBSTETRICS & GYNECOLOGY
Payer: COMMERCIAL

## 2018-11-28 DIAGNOSIS — M85.88 OSTEOPENIA OF OTHER SITE: ICD-10-CM

## 2018-11-28 DIAGNOSIS — Z12.31 VISIT FOR SCREENING MAMMOGRAM: ICD-10-CM

## 2018-11-28 PROCEDURE — 77067 SCR MAMMO BI INCL CAD: CPT

## 2018-11-28 PROCEDURE — 77080 DXA BONE DENSITY AXIAL: CPT

## 2018-12-08 ENCOUNTER — MYC MEDICAL ADVICE (OUTPATIENT)
Dept: NEUROLOGY | Facility: CLINIC | Age: 63
End: 2018-12-08

## 2019-04-04 ENCOUNTER — TELEPHONE (OUTPATIENT)
Dept: NEUROLOGY | Facility: CLINIC | Age: 64
End: 2019-04-04

## 2019-04-04 DIAGNOSIS — G35 MULTIPLE SCLEROSIS (H): Primary | ICD-10-CM

## 2019-04-04 NOTE — TELEPHONE ENCOUNTER
[facility] Call Center    Phone Message    May a detailed message be left on voicemail: yes    Reason for Call: Other: Pt started on Friday of last week with hand/arm weakness and then on Sunday it started in her right leg and she started having time with walking. Since Sunday both have been getting worse and wondering if she should be seen, or have an MRI done. Please call her back to discuss.      Action Taken: Message routed to:  [facility] ([facility] Neurology Clinic

## 2019-04-05 NOTE — TELEPHONE ENCOUNTER
To the best of my knowledge the right arm symptoms are new for her. I doubt that these are due to anything other than MS (e.g., stroke) given the gradual onset.    I am inclined to offer her empiric therapy with prednisone 1250 mg daily x 5 days (prednisone 50 mg, quantity 125, directions to take twenty-five daily for 5 days). The standard caveats apply--steroids have been shown to hasten recovery from relapse but not change long term outcome, are frequently associated with transient insomnia, increase appetite, and irritability, and rarely (<1%)  with more serious side effects including bone damage and pancreatic inflammation.    If unsure about steroids or wanting to defer, can obtain MRI brain and cervical spine with and without contrast.    She should follow up either with [facility] next week or me in the next 2-4 weeks.

## 2019-04-05 NOTE — TELEPHONE ENCOUNTER
Spoke with patient and we discussed Dr. Salazar's input below. She is hesitant to take steroids. She was diagnosed with osteopenia about 6-7 years ago and this concerns her. She also associates past experience with steroids to not have been beneficial.     She opts to have MRI imaging done at her earliest convenience. Orders placed on behalf of Dr. Salazar and patient will call to schedule herself.     I did advise her to go go [facility] ED over the weekend if she feels she needs more urgent attention and she is agreeable to this.     I will plan to f/u with her Monday morning to see how she is doing and move forward with scheduling clinic follow up.

## 2019-04-05 NOTE — TELEPHONE ENCOUNTER
"Called and spoke to patient. She tells me she started having trouble writing last Friday. Right hand/arm weakness worsened over a couple of days. She also reports some numbness in the arm. Then on Sunday she started having trouble walking. She is limping d/t RLE weakness and foot drop. A \"very noticeable\" change. All sx are on the right side of her body. She c/o numbness/sensitivity to touch on the right side of her trunk and around neck/shoulder. Lips are slightly numb.   She can't lift a glass or a spoon. Walking up steps are very difficult.     Denies slurred speech, facial droop, vision changes, b/b changes. No recent illness. She has been under a lot of stress with her dad passing, being unemployed for 2 years, divorce, etc.     Dr. Salazar, how would you like to proceed? MRI?   "

## 2019-04-08 NOTE — TELEPHONE ENCOUNTER
It was my understanding that her new problems involved the right arm--assuming that this is accurate, this would definitively indicate that the problem is not coming from the thoracic region.

## 2019-04-08 NOTE — TELEPHONE ENCOUNTER
Called Jud and let her know Dr. Salazar's input below as to the reasoning for not getting a thoracic MRI. Patient offered no further questions.

## 2019-04-08 NOTE — TELEPHONE ENCOUNTER
Called patient to see how she's doing after the weekend. She tells me her right sided sx did not seem to improve over the weekend and she had a lot of trouble sleeping. She has MRIs scheduled for 4/10 evening. She is still hesitant to start steroids.     I offered her an appointment with [facility] this week as Dr. Salazar's is attending. She would prefer to see MD. Offered her time on 4/18 and she would like to come in at 930. Clinic Coordinator to schedule this.    Dr. Salazar, patient is wondering why she is not getting a thoracic MRI. She feels that she has had a previous lesion in this area of her spine. And with her walking difficulties, she feels this may be indicated. I do not see that we have done thoracic imaging in the past. What do you recommend?

## 2019-04-10 ENCOUNTER — HOSPITAL ENCOUNTER (OUTPATIENT)
Dept: MRI IMAGING | Facility: CLINIC | Age: 64
End: 2019-04-10
Attending: PSYCHIATRY & NEUROLOGY
Payer: COMMERCIAL

## 2019-04-10 ENCOUNTER — HOSPITAL ENCOUNTER (OUTPATIENT)
Dept: MRI IMAGING | Facility: CLINIC | Age: 64
Discharge: HOME | End: 2019-04-10
Attending: PSYCHIATRY & NEUROLOGY | Admitting: PSYCHIATRY & NEUROLOGY
Payer: COMMERCIAL

## 2019-04-10 DIAGNOSIS — G35 MULTIPLE SCLEROSIS (H): ICD-10-CM

## 2019-04-10 PROCEDURE — A9585 GADOBUTROL INJECTION: HCPCS | Performed by: PSYCHIATRY & NEUROLOGY

## 2019-04-10 PROCEDURE — 70553 MRI BRAIN STEM W/O & W/DYE: CPT

## 2019-04-10 PROCEDURE — 25500064 ZZH RX 255 OP 636: Performed by: PSYCHIATRY & NEUROLOGY

## 2019-04-10 PROCEDURE — 72156 MRI NECK SPINE W/O & W/DYE: CPT

## 2019-04-10 RX ORDER — GADOBUTROL 604.72 MG/ML
6 INJECTION INTRAVENOUS ONCE
Status: COMPLETED | OUTPATIENT
Start: 2019-04-10 | End: 2019-04-10

## 2019-04-10 RX ADMIN — GADOBUTROL 6 ML: 604.72 INJECTION INTRAVENOUS at 17:30

## 2019-04-11 NOTE — TELEPHONE ENCOUNTER
Patient would like Rx sent to [facility]. This has been called into [facility]. They will have to get 50 mg tabs ordered in but will have it available by tomorrow.     Called patient and let her know this. She will start the 5 days of steroids tomorrow.

## 2019-04-11 NOTE — TELEPHONE ENCOUNTER
Per Dr. Salazar: I reviewed this patient's MRI scans from yesterday evening. These show several areas of active inflammatory demyelination in the brain and spinal cord, including an area in the right upper cervical spinal cord that accounts for her recent symptoms.     Thus, these are definitely due to MS relapse and would continue to offer steroids as per my previous communication with the goal of hastening recovery from symptoms.     She does have an appointment with me next week; however, please make her aware of these findings and offer steroid Rx as before.       Called patient and reported the above. And after much discussion, patient has agreed to taking steroids as recommended by Dr. Salazar.  prednisone 1250 mg daily x 5 days (prednisone 50 mg, quantity 125, directions to take twenty-five daily for 5 days).     Unfortunately, patient needs to find a new pharmacy that will take her benefits. She is going to call her insurance company and get back to me asap.

## 2019-04-18 ENCOUNTER — OFFICE VISIT (OUTPATIENT)
Dept: NEUROLOGY | Facility: CLINIC | Age: 64
End: 2019-04-18
Attending: PSYCHIATRY & NEUROLOGY
Payer: COMMERCIAL

## 2019-04-18 VITALS
DIASTOLIC BLOOD PRESSURE: 79 MMHG | SYSTOLIC BLOOD PRESSURE: 134 MMHG | HEIGHT: 65 IN | WEIGHT: 127.9 LBS | BODY MASS INDEX: 21.31 KG/M2 | HEART RATE: 66 BPM

## 2019-04-18 DIAGNOSIS — G35 MS (MULTIPLE SCLEROSIS) (H): Primary | ICD-10-CM

## 2019-04-18 DIAGNOSIS — G35 MS (MULTIPLE SCLEROSIS) (H): ICD-10-CM

## 2019-04-18 DIAGNOSIS — M79.2 NEUROPATHIC PAIN: ICD-10-CM

## 2019-04-18 LAB
ALBUMIN SERPL-MCNC: 4 G/DL (ref 3.4–5)
ALP SERPL-CCNC: 53 U/L (ref 40–150)
ALT SERPL W P-5'-P-CCNC: 29 U/L (ref 0–50)
AST SERPL W P-5'-P-CCNC: 16 U/L (ref 0–45)
BASOPHILS # BLD AUTO: 0 10E9/L (ref 0–0.2)
BASOPHILS NFR BLD AUTO: 0.1 %
BILIRUB DIRECT SERPL-MCNC: 0.1 MG/DL (ref 0–0.2)
BILIRUB SERPL-MCNC: 0.4 MG/DL (ref 0.2–1.3)
DEPRECATED CALCIDIOL+CALCIFEROL SERPL-MC: 72 UG/L (ref 20–75)
DIFFERENTIAL METHOD BLD: NORMAL
EOSINOPHIL # BLD AUTO: 0.1 10E9/L (ref 0–0.7)
EOSINOPHIL NFR BLD AUTO: 0.9 %
ERYTHROCYTE [DISTWIDTH] IN BLOOD BY AUTOMATED COUNT: 11.6 % (ref 10–15)
HCT VFR BLD AUTO: 40.5 % (ref 35–47)
HGB BLD-MCNC: 13.6 G/DL (ref 11.7–15.7)
IMM GRANULOCYTES # BLD: 0 10E9/L (ref 0–0.4)
IMM GRANULOCYTES NFR BLD: 0.5 %
LYMPHOCYTES # BLD AUTO: 2.9 10E9/L (ref 0.8–5.3)
LYMPHOCYTES NFR BLD AUTO: 34 %
MCH RBC QN AUTO: 31.3 PG (ref 26.5–33)
MCHC RBC AUTO-ENTMCNC: 33.6 G/DL (ref 31.5–36.5)
MCV RBC AUTO: 93 FL (ref 78–100)
MONOCYTES # BLD AUTO: 0.7 10E9/L (ref 0–1.3)
MONOCYTES NFR BLD AUTO: 7.9 %
NEUTROPHILS # BLD AUTO: 4.9 10E9/L (ref 1.6–8.3)
NEUTROPHILS NFR BLD AUTO: 56.6 %
NRBC # BLD AUTO: 0 10*3/UL
NRBC BLD AUTO-RTO: 0 /100
PLATELET # BLD AUTO: 279 10E9/L (ref 150–450)
PROT SERPL-MCNC: 7.4 G/DL (ref 6.8–8.8)
RBC # BLD AUTO: 4.34 10E12/L (ref 3.8–5.2)
T4 FREE SERPL-MCNC: 1.2 NG/DL (ref 0.76–1.46)
TSH SERPL DL<=0.005 MIU/L-ACNC: 4.72 MU/L (ref 0.4–4)
WBC # BLD AUTO: 8.6 10E9/L (ref 4–11)

## 2019-04-18 PROCEDURE — 85025 COMPLETE CBC W/AUTO DIFF WBC: CPT | Performed by: PSYCHIATRY & NEUROLOGY

## 2019-04-18 PROCEDURE — 84439 ASSAY OF FREE THYROXINE: CPT | Performed by: PSYCHIATRY & NEUROLOGY

## 2019-04-18 PROCEDURE — 82306 VITAMIN D 25 HYDROXY: CPT | Performed by: PSYCHIATRY & NEUROLOGY

## 2019-04-18 PROCEDURE — 36415 COLL VENOUS BLD VENIPUNCTURE: CPT | Performed by: PSYCHIATRY & NEUROLOGY

## 2019-04-18 PROCEDURE — 40000975 ZZHCL STATISTIC JC VIR AB INDEX INHIB: Performed by: PSYCHIATRY & NEUROLOGY

## 2019-04-18 PROCEDURE — 84443 ASSAY THYROID STIM HORMONE: CPT | Performed by: PSYCHIATRY & NEUROLOGY

## 2019-04-18 PROCEDURE — 80076 HEPATIC FUNCTION PANEL: CPT | Performed by: PSYCHIATRY & NEUROLOGY

## 2019-04-18 ASSESSMENT — MIFFLIN-ST. JEOR: SCORE: 1146.03

## 2019-04-18 ASSESSMENT — PAIN SCALES - GENERAL: PAINLEVEL: MODERATE PAIN (4)

## 2019-04-18 NOTE — LETTER
4/18/2019    RE: Sejal Barroso  [address]  [place] [address]     Referral source: Established patient     Chief complaint: Multiple sclerosis     History of the Present Illness:Ms. Sejal Barroso is a 63-year-old woman who presents to the [facility]clerosis Clinic today for a follow-up visit after recent clinical relapse of multiple sclerosis.      The patient's history is as per my previous notes.  She initially developed symptoms of demyelinating disease in 2008 when she presented with an enhancing lesion in the lower thoracic spinal cord resulting in persistent right leg weakness.  I had evaluated the patient during my fellowship at the [facility] subsequent to that event, and initially we did not initiate disease-modifying therapy.  Subsequently, the patient had a couple of episodes of new onset lancinating neuropathic pain including an episode of trigeminal neuralgia as well as lancinating pain affecting the left side of the neck in 2015.  Per her choice, she has never been on disease modifying medications for multiple sclerosis.      However, a couple of weeks ago the patient notified us that she was having new right-sided symptoms.  She initially noted numbness in the right hand that progressed to incoordination of the right hand to the point that she was unable to hold a toothbrush effectively on that side.  Her symptoms then evolved to include weakness of the right leg as well as a dysesthetic sensation of the right torso that she describes as an itching, sore sensation predominantly involving the shoulder, neck and upper back.      With the patient initially notified us of these symptoms we indicated to her that there was a strong suspicion for relapse of multiple sclerosis and offered therapy with steroids, but she preferred to proceed with MRI imaging prior to treatment.     I reviewed with her today images of the brain and cervical spine obtained on 04/10/2019.  In summary, these  demonstrate a new gadolinium enhancing focus in the right cervical cord at C2-3 that accounts for her current clinical symptoms.  In addition, MRI scan of the brain demonstrates new enhancing foci of T2 hyperintensity in several locations, including the right temporal deep white matter, right parietoccipital deep white matter and a periventricular focus adjacent to the left lateral ventricle.  The patient is here today to discuss the significance of these results and determine a future plan of care.      In the interim since the previous imaging, she has been treated with prednisone 1250 mg daily for 5 days at my direction.  She relates that the motor and sensory symptoms in the right hand as well as motor symptoms in the right leg have improved and she feels largely back to baseline in this regard.  She has still been bothered by the dysesthetic sensation on the right side of the body, although quite recently over the past 24 hours or so this seems somewhat better as well.      PHYSICAL EXAMINATION:   VITAL SIGNS:  Blood pressure 134/79; pulse 66; weight 58 kg; height 1.65 meters.   GENERAL:  Well-nourished woman who presents to the evaluation alone, awake and alert and in no acute distress.      Assessment/plan:    1.  Relapsing-remitting multiple sclerosis  At present, there is clear clinical and radiologic evidence of multifocal active inflammatory demyelination, and I told the patient that in this context I would strongly consider initiation of disease-modifying therapy to try to prevent similar events in the future.  She indicates that she is now interested in considering long-term treatment in light of this recent event, and we went on to discuss several options.      She asked about the most effective therapies.  I discussed with her that several parenteral therapies available for multiple sclerosis are likely the most potent treatments in terms of preventing active inflammatory demyelination, although these  agents do have known or theoretical disadvantages, including increased risk of opportunistic infections as well as possibly an increased risk of malignancy with agents such as ocrelizumab.      We discussed that oral therapies currently appear to occupy a somewhat intermediate place in the spectrum of disease-modifying therapies for multiple sclerosis, being not as potent as the highly active IV treatments but potentially somewhat lower in risk.  However, treatments including dimethyl fumarate and fingolimod have rarely been associated with serious opportunistic infections of the brain including progressive multifocal leukoencephalopathy (PML).  We discussed that fingolimod does have the advantage of being among the most potent oral disease-modifying options, having demonstrating superiority to a platform injectable therapy.  However, fingolimod does have a low but non-zero risk of serious side effects including risk of cardiac arrhythmia, rare occurrence of macular edema of the retina, increased risk of certain malignancies including basal cell carcinoma of the skin and known associated risk of opportunistic infections of the brain including PML, viral encephalitides and fungal meningitis.      We also discussed teriflunomide, which is an oral therapy that in several years of use has not been clearly linked to any increased risk of opportunistic infection.  This agent has been somewhat limited in use as it is a known human teratogen, but this is not a concern in Ms. Barroso's case.  Teriflunomide does have its own side effects including a relatively low risk of significant elevation in liver function tests, low occurrence of large-fiber peripheral neuropathy (less than 2% in a clinical trial) and occurrence of gastrointestinal side effects that are usually mild and rarely limiting to tolerance.      We also discussed the availability of platform injectable drugs, which are the treatments for multiple sclerosis that  have been on the market for the longest.  The advantage of these treatments is that they are known to be very safe in clinical practice, although they are not among the most potent anti-inflammatory agents.      I have provided the patient with information on several disease-modifying therapies including fingolimod, teriflunomide, the Rebif formulation of interferon beta and natalizumab.  I am going to perform routine laboratory screening studies in anticipation of likely initiation of disease-modifying therapy in the near future to include blood counts, liver function tests, thyroid studies and IGOR serology.  We will also check a vitamin D level and advise the patient on supplementation as needed to maintain her vitamin D level within the goal range of 60-80 mcg per liter.        I will see her back in clinic for a review in 2 weeks, at which time we will make a final decision regarding disease-modifying treatment.      2.  Neuropathic pain  The patient is experiencing some neuropathic discomfort in the right side of the body that is referable to the new demyelinating lesion in the upper cervical cord on the right.  I told her that we could initiate a symptomatic therapy for neuropathic pain at any time, such as gabapentin, but at present we are just going to observe her symptoms and see how these come along.  We can discuss a prescription if symptoms are not improving at the time of her next appointment in a couple of weeks.      I spent 46 minutes with the patient in the office today, with greater than 50% of this time spent in counseling.     Marcelino Salazar MD   of Neurology  [facility]    Cc:  Sami Bishop MD (PCP)  Patient

## 2019-04-24 NOTE — PROGRESS NOTES
Date of service: April 18, 2019     Referral source: Established patient     Chief complaint: Multiple sclerosis     History of the Present Illness:Ms. Sejal Barroso is a 63-year-old woman who presents to the [facility] today for a follow-up visit after recent clinical relapse of multiple sclerosis.      The patient's history is as per my previous notes.  She initially developed symptoms of demyelinating disease in 2008 when she presented with an enhancing lesion in the lower thoracic spinal cord resulting in persistent right leg weakness.  I had evaluated the patient during my fellowship at the [facility] subsequent to that event, and initially we did not initiate disease-modifying therapy.  Subsequently, the patient had a couple of episodes of new onset lancinating neuropathic pain including an episode of trigeminal neuralgia as well as lancinating pain affecting the left side of the neck in 2015.  Per her choice, she has never been on disease modifying medications for multiple sclerosis.      However, a couple of weeks ago the patient notified us that she was having new right-sided symptoms.  She initially noted numbness in the right hand that progressed to incoordination of the right hand to the point that she was unable to hold a toothbrush effectively on that side.  Her symptoms then evolved to include weakness of the right leg as well as a dysesthetic sensation of the right torso that she describes as an itching, sore sensation predominantly involving the shoulder, neck and upper back.      With the patient initially notified us of these symptoms we indicated to her that there was a strong suspicion for relapse of multiple sclerosis and offered therapy with steroids, but she preferred to proceed with MRI imaging prior to treatment.     I reviewed with her today images of the brain and cervical spine obtained on 04/10/2019.  In summary, these demonstrate a new gadolinium enhancing focus in the  right cervical cord at C2-3 that accounts for her current clinical symptoms.  In addition, MRI scan of the brain demonstrates new enhancing foci of T2 hyperintensity in several locations, including the right temporal deep white matter, right parietoccipital deep white matter and a periventricular focus adjacent to the left lateral ventricle.  The patient is here today to discuss the significance of these results and determine a future plan of care.      In the interim since the previous imaging, she has been treated with prednisone 1250 mg daily for 5 days at my direction.  She relates that the motor and sensory symptoms in the right hand as well as motor symptoms in the right leg have improved and she feels largely back to baseline in this regard.  She has still been bothered by the dysesthetic sensation on the right side of the body, although quite recently over the past 24 hours or so this seems somewhat better as well.      PHYSICAL EXAMINATION:   VITAL SIGNS:  Blood pressure 134/79; pulse 66; weight 58 kg; height 1.65 meters.   GENERAL:  Well-nourished woman who presents to the evaluation alone, awake and alert and in no acute distress.      Assessment/plan:    1.  Relapsing-remitting multiple sclerosis  At present, there is clear clinical and radiologic evidence of multifocal active inflammatory demyelination, and I told the patient that in this context I would strongly consider initiation of disease-modifying therapy to try to prevent similar events in the future.  She indicates that she is now interested in considering long-term treatment in light of this recent event, and we went on to discuss several options.      She asked about the most effective therapies.  I discussed with her that several parenteral therapies available for multiple sclerosis are likely the most potent treatments in terms of preventing active inflammatory demyelination, although these agents do have known or theoretical disadvantages,  including increased risk of opportunistic infections as well as possibly an increased risk of malignancy with agents such as ocrelizumab.      We discussed that oral therapies currently appear to occupy a somewhat intermediate place in the spectrum of disease-modifying therapies for multiple sclerosis, being not as potent as the highly active IV treatments but potentially somewhat lower in risk.  However, treatments including dimethyl fumarate and fingolimod have rarely been associated with serious opportunistic infections of the brain including progressive multifocal leukoencephalopathy (PML).  We discussed that fingolimod does have the advantage of being among the most potent oral disease-modifying options, having demonstrating superiority to a platform injectable therapy.  However, fingolimod does have a low but non-zero risk of serious side effects including risk of cardiac arrhythmia, rare occurrence of macular edema of the retina, increased risk of certain malignancies including basal cell carcinoma of the skin and known associated risk of opportunistic infections of the brain including PML, viral encephalitides and fungal meningitis.      We also discussed teriflunomide, which is an oral therapy that in several years of use has not been clearly linked to any increased risk of opportunistic infection.  This agent has been somewhat limited in use as it is a known human teratogen, but this is not a concern in Ms. Barroso's case.  Teriflunomide does have its own side effects including a relatively low risk of significant elevation in liver function tests, low occurrence of large-fiber peripheral neuropathy (less than 2% in a clinical trial) and occurrence of gastrointestinal side effects that are usually mild and rarely limiting to tolerance.      We also discussed the availability of platform injectable drugs, which are the treatments for multiple sclerosis that have been on the market for the longest.  The  advantage of these treatments is that they are known to be very safe in clinical practice, although they are not among the most potent anti-inflammatory agents.      I have provided the patient with information on several disease-modifying therapies including fingolimod, teriflunomide, the Rebif formulation of interferon beta and natalizumab.  I am going to perform routine laboratory screening studies in anticipation of likely initiation of disease-modifying therapy in the near future to include blood counts, liver function tests, thyroid studies and IGOR serology.  We will also check a vitamin D level and advise the patient on supplementation as needed to maintain her vitamin D level within the goal range of 60-80 mcg per liter.        I will see her back in clinic for a review in 2 weeks, at which time we will make a final decision regarding disease-modifying treatment.      2.  Neuropathic pain  The patient is experiencing some neuropathic discomfort in the right side of the body that is referable to the new demyelinating lesion in the upper cervical cord on the right.  I told her that we could initiate a symptomatic therapy for neuropathic pain at any time, such as gabapentin, but at present we are just going to observe her symptoms and see how these come along.  We can discuss a prescription if symptoms are not improving at the time of her next appointment in a couple of weeks.      I spent 46 minutes with the patient in the office today, with greater than 50% of this time spent in counseling.         SAIGE ALVARES MD             D: 2019   T: 2019   MT: nh      Name:     JOHN PALMER   MRN:      [id number]        Account:      [id number]   :      1955           Service Date: 2019      Document: [id number]

## 2019-04-25 LAB — LAB SCANNED RESULT: ABNORMAL

## 2019-05-02 ENCOUNTER — MEDICAL CORRESPONDENCE (OUTPATIENT)
Dept: HEALTH INFORMATION MANAGEMENT | Facility: CLINIC | Age: 64
End: 2019-05-02

## 2019-05-02 ENCOUNTER — TELEPHONE (OUTPATIENT)
Dept: NEUROLOGY | Facility: CLINIC | Age: 64
End: 2019-05-02

## 2019-05-02 ENCOUNTER — OFFICE VISIT (OUTPATIENT)
Dept: NEUROLOGY | Facility: CLINIC | Age: 64
End: 2019-05-02
Attending: PSYCHIATRY & NEUROLOGY
Payer: COMMERCIAL

## 2019-05-02 VITALS
WEIGHT: 126.4 LBS | BODY MASS INDEX: 21.06 KG/M2 | DIASTOLIC BLOOD PRESSURE: 77 MMHG | SYSTOLIC BLOOD PRESSURE: 124 MMHG | HEIGHT: 65 IN | HEART RATE: 76 BPM

## 2019-05-02 DIAGNOSIS — G35 MS (MULTIPLE SCLEROSIS) (H): Primary | ICD-10-CM

## 2019-05-02 DIAGNOSIS — G35 MULTIPLE SCLEROSIS (H): Primary | ICD-10-CM

## 2019-05-02 ASSESSMENT — PATIENT HEALTH QUESTIONNAIRE - PHQ9: SUM OF ALL RESPONSES TO PHQ QUESTIONS 1-9: 12

## 2019-05-02 ASSESSMENT — MIFFLIN-ST. JEOR: SCORE: 1139.23

## 2019-05-02 ASSESSMENT — PAIN SCALES - GENERAL: PAINLEVEL: NO PAIN (0)

## 2019-05-02 NOTE — LETTER
5/2/2019  RE: Sejal Barroso  [address]  [place] [address]     Referral source: Established patient     Chief complaint: Multiple sclerosis      History of the Present Illness: Ms. Sejal Barroso is a 63-year-old woman who returns to the Multiple Sclerosis Clinic today for scheduled follow-up visit to discuss initiation of disease-modifying therapy for MS.      The patient's history is as per my previous notes.  Initial onset of symptoms of MS was in 2008 when she had an enhancing lesion in the lower thoracic spinal cord that resulted in persistent right leg weakness.  Subsequently, she has had a couple of episodes of new onset lancinating neuropathic pain including an episode of trigeminal neuralgia as well as a second episode affecting the left side of the neck in 2015.  Per her choice, she has never been on disease-modifying therapy.      Last month, the patient developed subacute onset of new right-sided symptoms including incoordination of the right hand, weakness of the right leg and a dysesthetic sensation of the right torso.  MRI imaging demonstrated a new enhancing lesion in the right cervical cord to which her recent symptoms are referable, as well as several other areas of gadolinium enhancement in the brain.  We did treat the patient with 5 days of high dose oral steroids.  When I last met with her 2 weeks ago, we ordered laboratory studies for risk stratification regarding possible disease-modifying therapies and she returns today to review those results and decide on a plan of care.        Today, the patient reports that the uncomfortable dysesthetic sensation that she was experiencing previously has improved substantially.  She does still note some residual right foot numbness as well as intermittent muscle spasms affecting the right side of the body, but overall her symptoms are improving.      We went over the results of the laboratory tests that were ordered at the time of her  last appointment.  Blood counts and liver function tests were normal.  TSH level was minimally elevated at 4.72 but thyroid hormone levels were also normal.  Vitamin D was within the goal range of 60-80 at 72 mcg per liter and I advised her to continue her current vitamin D supplement.  However, she was positive for the IGOR virus with IGOR index of 0.48.      PHYSICAL EXAMINATION:   VITAL SIGNS:  Blood pressure 124/77; pulse 76; weight 57.3 kg; height 1.65 meters.   GENERAL:  Well-nourished woman who presents to the evaluation alone, awake and alert and in no acute distress.    Assessment/plan:    1.  Relapsing-remitting multiple sclerosis  The focus of our discussion today was options for disease modifying therapies for multiple sclerosis, which the patient remains open to pursuing, and I think that this is the right decision at present.      In light of her IGOR seropositivity, natalizumab would not be an appropriate choice for first-line treatment.      Of the therapies that we discussed previously, she indicates that she is not comfortable with possibility of initiating fingolimod due to multiple listed side effects.  There is indeed a non-zero risk of opportunistic infection of the brain associated with that treatment and given the fairly long disease history here, I do think it is reasonable to begin with a more conservative option.      We primarily discussed teriflunomide and beta interferons.  I discussed with her that we do not have head to head data comparing these 2 medications, but I tend to regard them as being in a similar tier of efficacy.  Interferon beta formulations do have a very long history of safety and tolerability as long as appropriate laboratory monitoring is performed, but are among the most difficult MS therapies to tolerate due to relatively frequent occurrence of flu-like side effects such as myalgias, headache and low-grade fever.  In most patients, these can be successfully mitigated by  giving the injections at night and along with an anti-inflammatory such as naproxen, but occasionally these can be limiting to tolerance.  A proportion of patients will also develop significant laboratory abnormalities on beta interferon formulations, most commonly asymptomatic elevation of liver function tests, and these can also occasionally require discontinuation of the medication.      Side effects of teriflunomide frequently include hair thinning, which can be cosmetically bothersome but usually stabilizes with time, gastrointestinal side effects such as nausea that are typically mild and rarely limiting to tolerance, and rare occurrence of large-fiber peripheral neuropathy.  Liver function abnormalities are a known side effect of this therapy as well, although I told the patient that in my experience these are actually less frequent with teriflunomide as compared to beta interferon.      After discussion, we mutually decided to go forward with a trial of the Rebif formulation of beta interferon.  She is aware that she will need blood tests performed at 1, 3 and 6 months after the initiation of this therapy and I will plan on having her see our nurse practitioner back in 6 weeks for a review.  Subsequently, I would like to see her back in about 6 months with an MRI scan of the brain prior to that visit to make sure that the radiologic aspect of her condition is stabilizing on the new treatment.      I spent 44 minutes with the patient in the office today, with greater than 50% of this time spent in counseling.     Marcelino Salazar MD   of Neurology  [facility]    Cc:  Sami Bishop MD (PCP)  Patient

## 2019-05-02 NOTE — PATIENT INSTRUCTIONS
1. We filled out the forms to initiate Rebif today    2. Return to clinic in six weeks    3. You will need blood tests at 1, 3, and 6 months after initiation of this medication and periodically thereafter

## 2019-05-02 NOTE — TELEPHONE ENCOUNTER
PA Initiation    Medication: Rebif 44mcg and Titration Pack (PENDING)  Insurance Company: [employer] - Phone [phone] Fax [phone]  Pharmacy Filling the Rx: [facility] - [place], [place] - [facility] [address]  Filling Pharmacy Phone: [phone]  Filling Pharmacy Fax: [phone]  Start Date: 5/2/2019        Hub form (will fax once approved)

## 2019-05-02 NOTE — TELEPHONE ENCOUNTER
Prior Authorization Specialty Medication Request    Medication/Dose: Rebif 44mcg and Titration Pack  ICD code (if different than what is on RX):  Relapsing Remitting Multiple Sclerosis, G35  Previously Tried and Failed:  None    Important Lab Values: n/a  Rationale: Initiation of disease modifying therapy for demyelinating disease, please approve.    Insurance Name: [employer]  Insurance ID: [id number]  Insurance Phone Number: [phone]    Pharmacy Information (if different than what is on RX)  Name:  n/a  Phone:  n/a

## 2019-05-03 NOTE — TELEPHONE ENCOUNTER
Walt from [facility] called to inform us that the PA has been denied; the patient must first try and fail 6 months of Copaxone. The denial letter will be mailed out to the patient and clinic. Walt can be reached at [phone].

## 2019-05-07 NOTE — PROGRESS NOTES
Date of service: May 2, 2019     Referral source: Established patient     Chief complaint: Multiple sclerosis      History of the Present Illness: Ms. Sejal Barroso is a 63-year-old woman who returns to the Multiple [facility]clerosis Clinic today for scheduled follow-up visit to discuss initiation of disease-modifying therapy for MS.      The patient's history is as per my previous notes.  Initial onset of symptoms of MS was in 2008 when she had an enhancing lesion in the lower thoracic spinal cord that resulted in persistent right leg weakness.  Subsequently, she has had a couple of episodes of new onset lancinating neuropathic pain including an episode of trigeminal neuralgia as well as a second episode affecting the left side of the neck in 2015.  Per her choice, she has never been on disease-modifying therapy.      Last month, the patient developed subacute onset of new right-sided symptoms including incoordination of the right hand, weakness of the right leg and a dysesthetic sensation of the right torso.  MRI imaging demonstrated a new enhancing lesion in the right cervical cord to which her recent symptoms are referable, as well as several other areas of gadolinium enhancement in the brain.  We did treat the patient with 5 days of high dose oral steroids.  When I last met with her 2 weeks ago, we ordered laboratory studies for risk stratification regarding possible disease-modifying therapies and she returns today to review those results and decide on a plan of care.        Today, the patient reports that the uncomfortable dysesthetic sensation that she was experiencing previously has improved substantially.  She does still note some residual right foot numbness as well as intermittent muscle spasms affecting the right side of the body, but overall her symptoms are improving.      We went over the results of the laboratory tests that were ordered at the time of her last appointment.  Blood counts and liver function  tests were normal.  TSH level was minimally elevated at 4.72 but thyroid hormone levels were also normal.  Vitamin D was within the goal range of 60-80 at 72 mcg per liter and I advised her to continue her current vitamin D supplement.  However, she was positive for the IGOR virus with IGOR index of 0.48.      PHYSICAL EXAMINATION:   VITAL SIGNS:  Blood pressure 124/77; pulse 76; weight 57.3 kg; height 1.65 meters.   GENERAL:  Well-nourished woman who presents to the evaluation alone, awake and alert and in no acute distress.      Assessment/plan:    1.  Relapsing-remitting multiple sclerosis  The focus of our discussion today was options for disease modifying therapies for multiple sclerosis, which the patient remains open to pursuing, and I think that this is the right decision at present.      In light of her IGOR seropositivity, natalizumab would not be an appropriate choice for first-line treatment.      Of the therapies that we discussed previously, she indicates that she is not comfortable with possibility of initiating fingolimod due to multiple listed side effects.  There is indeed a non-zero risk of opportunistic infection of the brain associated with that treatment and given the fairly long disease history here, I do think it is reasonable to begin with a more conservative option.      We primarily discussed teriflunomide and beta interferons.  I discussed with her that we do not have head to head data comparing these 2 medications, but I tend to regard them as being in a similar tier of efficacy.  Interferon beta formulations do have a very long history of safety and tolerability as long as appropriate laboratory monitoring is performed, but are among the most difficult MS therapies to tolerate due to relatively frequent occurrence of flu-like side effects such as myalgias, headache and low-grade fever.  In most patients, these can be successfully mitigated by giving the injections at night and along with an  anti-inflammatory such as naproxen, but occasionally these can be limiting to tolerance.  A proportion of patients will also develop significant laboratory abnormalities on beta interferon formulations, most commonly asymptomatic elevation of liver function tests, and these can also occasionally require discontinuation of the medication.      Side effects of teriflunomide frequently include hair thinning, which can be cosmetically bothersome but usually stabilizes with time, gastrointestinal side effects such as nausea that are typically mild and rarely limiting to tolerance, and rare occurrence of large-fiber peripheral neuropathy.  Liver function abnormalities are a known side effect of this therapy as well, although I told the patient that in my experience these are actually less frequent with teriflunomide as compared to beta interferon.      After discussion, we mutually decided to go forward with a trial of the Rebif formulation of beta interferon.  She is aware that she will need blood tests performed at 1, 3 and 6 months after the initiation of this therapy and I will plan on having her see our nurse practitioner back in 6 weeks for a review.  Subsequently, I would like to see her back in about 6 months with an MRI scan of the brain prior to that visit to make sure that the radiologic aspect of her condition is stabilizing on the new treatment.      I spent 44 minutes with the patient in the office today, with greater than 50% of this time spent in counseling.         SAIGE ALVARES MD             D: 2019   T: 2019   MT: nh      Name:     JOHN PALMER   MRN:      [id number]        Account:      [id number]   :      1955           Service Date: 2019      Document: [id number]

## 2019-05-14 NOTE — TELEPHONE ENCOUNTER
Health Call Center    Phone Message    May a detailed message be left on voicemail: yes    Reason for Call: Other: [employer] calling to get an update on the Rebif appeal.  WIll start patient on the application for the Free Drug Program while medication is going through appeal.     Action Taken: Message routed to:  [facility] ([facility] neurology

## 2019-05-16 NOTE — TELEPHONE ENCOUNTER
Medication Appeal Initiation    We have initiated an appeal for the requested medication:  Medication: Rebif 44mcg and Titration Pack (APPEAL PENDING)  Appeal Start Date:  5/16/2019  Insurance Company: [employer] - Phone [phone] Fax [phone]  Comments:  Faxed urgent appeal request to [employer] for Rebif at fax # [phone]    Original denial letter received from [employer]:          Appeal info:

## 2019-05-20 NOTE — TELEPHONE ENCOUNTER
Prescription for Rebif titration sent to [facility] per [place] refill protocol.    Nano Eckert, MS RN Care Coordinator

## 2019-05-20 NOTE — TELEPHONE ENCOUNTER
MEDICATION APPEAL APPROVED    Medication: Rebif 44mcg and Titration Pack (APPROVED)  Authorization Effective Date: 5/17/2019  Authorization Expiration Date: 5/16/2020  Approved Dose/Quantity: 28 days  Reference #: [id number] KEY: [id number]   Insurance Company: [employer] - Phone [phone] Fax [phone]  Expected CoPay:       CoPay Card Available: No    Foundation Assistance Needed:    Which Pharmacy is filling the prescription (Not needed for infusion/clinic administered): [facility]      Left message for patient with [employer]'s phone #.

## 2019-06-13 ENCOUNTER — OFFICE VISIT (OUTPATIENT)
Dept: NEUROLOGY | Facility: CLINIC | Age: 64
End: 2019-06-13
Attending: PSYCHIATRY & NEUROLOGY
Payer: COMMERCIAL

## 2019-06-13 VITALS
WEIGHT: 124.1 LBS | HEART RATE: 81 BPM | SYSTOLIC BLOOD PRESSURE: 134 MMHG | HEIGHT: 65 IN | BODY MASS INDEX: 20.68 KG/M2 | DIASTOLIC BLOOD PRESSURE: 80 MMHG

## 2019-06-13 DIAGNOSIS — G35 MULTIPLE SCLEROSIS (H): ICD-10-CM

## 2019-06-13 DIAGNOSIS — G35 MULTIPLE SCLEROSIS (H): Primary | ICD-10-CM

## 2019-06-13 LAB
ALT SERPL W P-5'-P-CCNC: 25 U/L (ref 0–50)
AST SERPL W P-5'-P-CCNC: 22 U/L (ref 0–45)
BASOPHILS # BLD AUTO: 0 10E9/L (ref 0–0.2)
BASOPHILS NFR BLD AUTO: 0.6 %
DIFFERENTIAL METHOD BLD: NORMAL
EOSINOPHIL # BLD AUTO: 0 10E9/L (ref 0–0.7)
EOSINOPHIL NFR BLD AUTO: 0.6 %
ERYTHROCYTE [DISTWIDTH] IN BLOOD BY AUTOMATED COUNT: 11.3 % (ref 10–15)
HCT VFR BLD AUTO: 42.7 % (ref 35–47)
HGB BLD-MCNC: 14.3 G/DL (ref 11.7–15.7)
IMM GRANULOCYTES # BLD: 0 10E9/L (ref 0–0.4)
IMM GRANULOCYTES NFR BLD: 0.4 %
LYMPHOCYTES # BLD AUTO: 0.8 10E9/L (ref 0.8–5.3)
LYMPHOCYTES NFR BLD AUTO: 15.2 %
MCH RBC QN AUTO: 31 PG (ref 26.5–33)
MCHC RBC AUTO-ENTMCNC: 33.5 G/DL (ref 31.5–36.5)
MCV RBC AUTO: 92 FL (ref 78–100)
MONOCYTES # BLD AUTO: 0.6 10E9/L (ref 0–1.3)
MONOCYTES NFR BLD AUTO: 12.2 %
NEUTROPHILS # BLD AUTO: 3.5 10E9/L (ref 1.6–8.3)
NEUTROPHILS NFR BLD AUTO: 71 %
NRBC # BLD AUTO: 0 10*3/UL
NRBC BLD AUTO-RTO: 0 /100
PLATELET # BLD AUTO: 279 10E9/L (ref 150–450)
RBC # BLD AUTO: 4.62 10E12/L (ref 3.8–5.2)
WBC # BLD AUTO: 5 10E9/L (ref 4–11)

## 2019-06-13 PROCEDURE — G0463 HOSPITAL OUTPT CLINIC VISIT: HCPCS | Mod: ZF

## 2019-06-13 PROCEDURE — 84460 ALANINE AMINO (ALT) (SGPT): CPT | Performed by: NURSE PRACTITIONER

## 2019-06-13 PROCEDURE — 85025 COMPLETE CBC W/AUTO DIFF WBC: CPT | Performed by: NURSE PRACTITIONER

## 2019-06-13 PROCEDURE — 36415 COLL VENOUS BLD VENIPUNCTURE: CPT | Performed by: NURSE PRACTITIONER

## 2019-06-13 PROCEDURE — 84450 TRANSFERASE (AST) (SGOT): CPT | Performed by: NURSE PRACTITIONER

## 2019-06-13 ASSESSMENT — PAIN SCALES - GENERAL: PAINLEVEL: MODERATE PAIN (5)

## 2019-06-13 ASSESSMENT — MIFFLIN-ST. JEOR: SCORE: 1128.79

## 2019-06-13 NOTE — PROGRESS NOTES
"[facility] OUTPATIENT MULTIPLE SCLEROSIS CLINIC VISIT NOTE      REASON FOR VISIT: Sejal is a 63 year old right handed female who presents to the clinic today after starting Rebif recently for Relapsing Remitting MS. She  was most recently seen by Dr. Salazar on 5/2/2019. She presents to the evaluation alone.     HISTORY OF PRESENT ILLNESS: Per Dr. Salazar's prior notes, initial onset of symptoms of MS was in 2008 when she had an enhancing lesion in the lower thoracic spinal cord that resulted in persistent right leg weakness.  Subsequently, she has had a couple of episodes of new onset lancinating neuropathic pain including an episode of trigeminal neuralgia as well as a second episode affecting the left side of the neck in 2015.  Per her choice, she has never been on disease-modifying therapy.     Then in 4/2019, patient had a relapse with right-sided symptoms including incoordination of the right hand, weakness of the right leg and a dysesthetic sensation of the right torso.  MRI imaging demonstrated a new enhancing lesion in the right cervical cord to which her recent symptoms are referable, as well as several other areas of gadolinium enhancement in the brain. She was treated with HD oral steroids.         INTERVAL HISTORY SINCE LAST VISIT: Most recent visit with Dr. Salazar on 5/2/2019. Different DMT options were discussed with the patient and she was started on Rebif. She started this medication on 5/24 and so far had 10 injections. Overall comfortable with injections except when she has to inject in the back of her arms. She uses Rebidose and uses at least 6-7 sites. When asked about side effects, patient reports that she has \"intermittent headaches and worsening dizziness\" after starting Rebif.     General seema has been stable otherwise. No recent hospitalization or illness. Patient denies any new changes in vision, balance, strength or sensation suggestive of new relapse of " "multiple sclerosis since she was last seen here. Patient reports intermittent numbness in her right hand, bilateral lower extremities and difficulty with walking as her residual symptoms.     She reports that her vision has been \"blurry\" at least since college, but denies double vision, vision loss or color desaturation. No speech, swallowing or hearing concerns. She feels that her right leg is weaker than the left. Able to climb stairs without major issues. She reports intermittent \"room spinning\" feeling. She reports a fall other night while she was looking up and cleaning her ceiling fan/ windows. No major injuries from this fall.     She feels that her mood has been down recently, especially after her recent relapse. She was laid off from her work as an  2 years ago and currently on medical assistance. She has been looking for a full time job for the past 2 years and hasn't been successful. She do not feel like she needs to be treated for her mood at this time.     She reports mild fatigue. She sleeps 8 hours at night. No major issues with spasticity. She has urinary urgency. She reports worsening urinary incontinence in winter months.       PAST MEDICAL/SURGICAL HISTORY:   1. Migraine  2. Relapsing Remitting MS   3. Osteopenia  4. C section x 3  5. Tonsillectomy      FAMILY HISTORY: Negative for Multiple Sclerosis.     SOCIAL HISTORY: She is single. She has 3 adult daughters. She is a life time non smoker. She denies alcohol or recreational drug use.     4/2019 MRI Brain:  IMPRESSION:  Three new areas of T2 FLAIR hyperintensity and  enhancement concerning for active demyelination. New lesion in the  upper cervical cord. Refer to spine report for additional details.    4/2019MRI Cervical spine:  IMPRESSION:    1. New 7 x 8 x 23 mm T2 hyperintensity within the cervical cord at the  level of C2-C3 with corresponding enhancement, suspicious for active  demyelination.  2. Moderate-to-severe degenerative " "change as detailed, worst at C4-C5,  C5-C6, and C6-C7.         Vit D: Unsure about the dose. Her most recent level from 4/2019 was 72.     PHYSICAL EXAMINATION:  VITAL SIGNS: /80 (BP Location: Left arm, Patient Position: Chair, Cuff Size: Adult Regular)   Pulse 81   Ht 1.651 m (5' 5\")   Wt 56.3 kg (124 lb 1.6 oz)   BMI 20.65 kg/m     MENTAL STATUS: Alert,awake and oriented times four.   CRANIAL NERVES: Visual fields are full to confrontation. The pupils are equal, round and react to light and there is no Juan Francisco Cecil pupil. Funduscopic examination demonstrates no optic pallor, papilledema or retinal hemorrhage/exudate. Extraocular movements are intact with no internuclear ophthalmoplegia. No nystagmus. Facial strength and sensation are  normal. Hearing is normal. Palate elevation and tongue protrusion are normal.   POWER: Strength is as follows in the following muscles/groups (Right/Left,MRC scale): Shoulder abduction 5/5, elbow flexion 5/5, elbow extension 5/5, wrist extension 5/5, digit extension 5/5, digit flexion 5/5, Hip flexion 4-/5, knee extension 5/5, knee flexion 4+/5, foot dorsiflexion 4+/5.   SENSORY: Intact to light touch.   REFLEXES: Reflexes are normal, present and symmetric at biceps, triceps, brachioradialis, knees and ankles.   MOTOR/CEREBELLAR: There are no tremors, myoclonus or other abnormal movements. Tone is within normal limits in the limbs. There is no appendicular ataxia on finger-to-nose testing and rapid alternating movements are normal in the extremities. There is no pronator drift.   GAIT: Gait is slightly wide-based but steady. Tandem mildly impaired for her age. Difficulty noted with heel and toe walking.       ASSESSMENT/ PLAN:   1. Relapsing Remitting MS, on Rebif since 5/2019: Initial symptoms in 2008. Off DMTs until recently when she had a clinical relapse with radiological evidence of active demyelination in her brain and C spine MRI. She was started on Rebif in May/2019. " "Overall tolerating this medication except \"intermittent headache (mild) and worsening dizziness\". I told her that headache could be a side effect from Rebif, but dizziness is not a commonly reported side effect. She is agreeable to continue this medication for now. She is planning to have a follow up RN visit from [facility] to make sure she is using the correct injection technique etc. She will contact us if there is worsening of these side effects. We will check her blood counts and liver enzymes for Rebif monitoring. She will recheck this in 3 months. Patient to follow up with Dr. Salazar in 6 months after brain imaging. Today she reports that she will not need any oral sedation prior to her MRI. She had to use it once in the past.    2. Vit D level: Unsure about the dose she is currently on. Her most recent level from 4/2019 was 72. No changes were made.     3. Right leg weakness: This has been ongoing, maybe a bit worse after her recent relapse. I discussed PT at [facility]. Patient would like to wait for now. She will contact us if she feels the need for it in the future.    4. Please contact us with questions or concerns.     Haven Solis, CNP  [facility]          I spent 65 minutes with this patient today, greater than 50% of which was spent in counseling and coordination of care.     (Chart documentation was completed in part with Dragon voice-recognition software. Even though reviewed, some grammatical, spelling, and word errors may remain.)    "

## 2019-06-13 NOTE — LETTER
6/13/2019       RE: Sejal Barroso  [address]  [place] [address]     Dear Colleague,    Thank you for referring your patient, Sejal Barroso, to the [facility] MULTIPLE SCLEROSIS at [facility]. Please see a copy of my visit note below.    [facility] OUTPATIENT MULTIPLE SCLEROSIS CLINIC VISIT NOTE      REASON FOR VISIT: Sejal is a 63 year old right handed female who presents to the clinic today after starting Rebif recently for Relapsing Remitting MS. She  was most recently seen by Dr. Salazar on 5/2/2019. She presents to the evaluation alone.     HISTORY OF PRESENT ILLNESS: Per Dr. Salazar's prior notes, initial onset of symptoms of MS was in 2008 when she had an enhancing lesion in the lower thoracic spinal cord that resulted in persistent right leg weakness.  Subsequently, she has had a couple of episodes of new onset lancinating neuropathic pain including an episode of trigeminal neuralgia as well as a second episode affecting the left side of the neck in 2015.  Per her choice, she has never been on disease-modifying therapy.     Then in 4/2019, patient had a relapse with right-sided symptoms including incoordination of the right hand, weakness of the right leg and a dysesthetic sensation of the right torso.  MRI imaging demonstrated a new enhancing lesion in the right cervical cord to which her recent symptoms are referable, as well as several other areas of gadolinium enhancement in the brain. She was treated with HD oral steroids.     INTERVAL HISTORY SINCE LAST VISIT: Most recent visit with Dr. Salazar on 5/2/2019. Different DMT options were discussed with the patient and she was started on Rebif. She started this medication on 5/24 and so far had 10 injections. Overall comfortable with injections except when she has to inject in the back of her arms. She uses Rebidose and uses at least 6-7 sites. When asked about side  "effects, patient reports that she has \"intermittent headaches and worsening dizziness\" after starting Rebif.     General seema has been stable otherwise. No recent hospitalization or illness. Patient denies any new changes in vision, balance, strength or sensation suggestive of new relapse of multiple sclerosis since she was last seen here. Patient reports intermittent numbness in her right hand, bilateral lower extremities and difficulty with walking as her residual symptoms.     She reports that her vision has been \"blurry\" at least since college, but denies double vision, vision loss or color desaturation. No speech, swallowing or hearing concerns. She feels that her right leg is weaker than the left. Able to climb stairs without major issues. She reports intermittent \"room spinning\" feeling. She reports a fall other night while she was looking up and cleaning her ceiling fan/ windows. No major injuries from this fall.     She feels that her mood has been down recently, especially after her recent relapse. She was laid off from her work as an  2 years ago and currently on medical assistance. She has been looking for a full time job for the past 2 years and hasn't been successful. She do not feel like she needs to be treated for her mood at this time.     She reports mild fatigue. She sleeps 8 hours at night. No major issues with spasticity. She has urinary urgency. She reports worsening urinary incontinence in winter months.     PAST MEDICAL/SURGICAL HISTORY:   1. Migraine  2. Relapsing Remitting MS   3. Osteopenia  4. C section x 3  5. Tonsillectomy    FAMILY HISTORY: Negative for Multiple Sclerosis.     SOCIAL HISTORY: She is single. She has 3 adult daughters. She is a life time non smoker. She denies alcohol or recreational drug use.     4/2019 MRI Brain:  IMPRESSION:  Three new areas of T2 FLAIR hyperintensity and  enhancement concerning for active demyelination. New lesion in the  upper cervical " "cord. Refer to spine report for additional details.    4/2019MRI Cervical spine:  IMPRESSION:    1. New 7 x 8 x 23 mm T2 hyperintensity within the cervical cord at the  level of C2-C3 with corresponding enhancement, suspicious for active  demyelination.  2. Moderate-to-severe degenerative change as detailed, worst at C4-C5,  C5-C6, and C6-C7.         Vit D: Unsure about the dose. Her most recent level from 4/2019 was 72.     PHYSICAL EXAMINATION:  VITAL SIGNS: /80 (BP Location: Left arm, Patient Position: Chair, Cuff Size: Adult Regular)   Pulse 81   Ht 1.651 m (5' 5\")   Wt 56.3 kg (124 lb 1.6 oz)   BMI 20.65 kg/m      MENTAL STATUS: Alert,awake and oriented times four.   CRANIAL NERVES: Visual fields are full to confrontation. The pupils are equal, round and react to light and there is no Juan Francisco Cecil pupil. Funduscopic examination demonstrates no optic pallor, papilledema or retinal hemorrhage/exudate. Extraocular movements are intact with no internuclear ophthalmoplegia. No nystagmus. Facial strength and sensation are  normal. Hearing is normal. Palate elevation and tongue protrusion are normal.   POWER: Strength is as follows in the following muscles/groups (Right/Left,MRC scale): Shoulder abduction 5/5, elbow flexion 5/5, elbow extension 5/5, wrist extension 5/5, digit extension 5/5, digit flexion 5/5, Hip flexion 4-/5, knee extension 5/5, knee flexion 4+/5, foot dorsiflexion 4+/5.   SENSORY: Intact to light touch.   REFLEXES: Reflexes are normal, present and symmetric at biceps, triceps, brachioradialis, knees and ankles.   MOTOR/CEREBELLAR: There are no tremors, myoclonus or other abnormal movements. Tone is within normal limits in the limbs. There is no appendicular ataxia on finger-to-nose testing and rapid alternating movements are normal in the extremities. There is no pronator drift.   GAIT: Gait is slightly wide-based but steady. Tandem mildly impaired for her age. Difficulty noted with heel " "and toe walking.     ASSESSMENT/ PLAN:   1. Relapsing Remitting MS, on Rebif since 5/2019: Initial symptoms in 2008. Off DMTs until recently when she had a clinical relapse with radiological evidence of active demyelination in her brain and C spine MRI. She was started on Rebif in May/2019. Overall tolerating this medication except \"intermittent headache (mild) and worsening dizziness\". I told her that headache could be a side effect from Rebif, but dizziness is not a commonly reported side effect. She is agreeable to continue this medication for now. She is planning to have a follow up RN visit from [facility] to make sure she is using the correct injection technique etc. She will contact us if there is worsening of these side effects. We will check her blood counts and liver enzymes for Rebif monitoring. She will recheck this in 3 months. Patient to follow up with Dr. Salazar in 6 months after brain imaging. Today she reports that she will not need any oral sedation prior to her MRI. She had to use it once in the past.    2. Vit D level: Unsure about the dose she is currently on. Her most recent level from 4/2019 was 72. No changes were made.     3. Right leg weakness: This has been ongoing, maybe a bit worse after her recent relapse. I discussed PT at [facility]. Patient would like to wait for now. She will contact us if she feels the need for it in the future.    4. Please contact us with questions or concerns.     Haven Solis, CNP  [facility]    I spent 65 minutes with this patient today, greater than 50% of which was spent in counseling and coordination of care.     (Chart documentation was completed in part with Dragon voice-recognition software. Even though reviewed, some grammatical, spelling, and word errors may remain.)  "

## 2019-06-13 NOTE — PATIENT INSTRUCTIONS
1. Labs today. Repeat in 3 months for Rebif monitoring.     2. Schedule Nurse follow up for Rebif injection.     3. MRI Brain in 6 months. F/u with Dr. Salazar after imaging.

## 2019-06-17 ENCOUNTER — TELEPHONE (OUTPATIENT)
Dept: NEUROLOGY | Facility: CLINIC | Age: 64
End: 2019-06-17

## 2019-06-17 NOTE — TELEPHONE ENCOUNTER
PA Initiation    Medication: Rebif 22MCG/0.5ML Syringes (PENDING)  Insurance Company: [employer] - Phone [phone] Fax [phone]  Pharmacy Filling the Rx: [facility] [place], [place] - [address] [place] [address]  Filling Pharmacy Phone:    Filling Pharmacy Fax:    Start Date: 6/17/2019

## 2019-06-17 NOTE — TELEPHONE ENCOUNTER
Walt from [facility] called to inform us that the PA has been approved. However, the patient must fill the at [facility].

## 2019-06-18 NOTE — TELEPHONE ENCOUNTER
Prior Authorization Approval    Authorization Effective Date: 6/18/2019  Authorization Expiration Date: 6/17/2020  Medication: Rebif 22MCG/0.5ML Syringes (APPROVED)  Approved Dose/Quantity: 12 syringes  Reference #: CMM KEY: [id number]   Insurance Company: [employer] - Phone [phone] Fax [phone]  Expected CoPay:       CoPay Card Available:      Foundation Assistance Needed:    Which Pharmacy is filling the prescription (Not needed for infusion/clinic administered): [address]  Pharmacy Notified: Yes  Patient Notified: Yes      *awaiting PA approval letter from [employer]*

## 2019-07-16 ENCOUNTER — TELEPHONE (OUTPATIENT)
Dept: NEUROLOGY | Facility: CLINIC | Age: 64
End: 2019-07-16

## 2019-07-16 NOTE — TELEPHONE ENCOUNTER
PA Initiation    Medication: Rebif Rebidose 44MCG/0.5ML (PENDING)  Insurance Company: [employer] - Phone [phone] Fax [phone]  Pharmacy Filling the Rx: [facility] - [place] [address] [address]  Filling Pharmacy Phone:    Filling Pharmacy Fax:    Start Date: 7/16/2019

## 2019-07-16 NOTE — TELEPHONE ENCOUNTER
Walt from [facility] called to inform us that the PA has been approved effective 05/17/2019 through 05/16/2020. The medication must be filled at [facility] pharmacy.

## 2019-07-17 NOTE — TELEPHONE ENCOUNTER
Prior Authorization Approval    Authorization Effective Date: 5/17/2019  Authorization Expiration Date: 5/16/2020  Medication: Rebif Rebidose 44MCG/0.5ML (APPROVED)  Approved Dose/Quantity: 28 days  Reference #: CMM KEY: [id number]   Insurance Company: [employer] - Phone [phone] Fax [phone]  Expected CoPay:       CoPay Card Available:      Foundation Assistance Needed:    Which Pharmacy is filling the prescription (Not needed for infusion/clinic administered): [facility] - [place], [place] - [facility] [address] [facility]

## 2019-10-03 ENCOUNTER — HEALTH MAINTENANCE LETTER (OUTPATIENT)
Age: 64
End: 2019-10-03

## 2019-10-08 DIAGNOSIS — G35 MULTIPLE SCLEROSIS (H): ICD-10-CM

## 2019-10-08 LAB
BASOPHILS # BLD AUTO: 0 10E9/L (ref 0–0.2)
BASOPHILS NFR BLD AUTO: 0.2 %
DIFFERENTIAL METHOD BLD: ABNORMAL
EOSINOPHIL # BLD AUTO: 0.1 10E9/L (ref 0–0.7)
EOSINOPHIL NFR BLD AUTO: 0.9 %
ERYTHROCYTE [DISTWIDTH] IN BLOOD BY AUTOMATED COUNT: 11.9 % (ref 10–15)
HCT VFR BLD AUTO: 35.4 % (ref 35–47)
HGB BLD-MCNC: 12.4 G/DL (ref 11.7–15.7)
LYMPHOCYTES # BLD AUTO: 0.6 10E9/L (ref 0.8–5.3)
LYMPHOCYTES NFR BLD AUTO: 10.7 %
MCH RBC QN AUTO: 31.2 PG (ref 26.5–33)
MCHC RBC AUTO-ENTMCNC: 35 G/DL (ref 31.5–36.5)
MCV RBC AUTO: 89 FL (ref 78–100)
MONOCYTES # BLD AUTO: 0.8 10E9/L (ref 0–1.3)
MONOCYTES NFR BLD AUTO: 14.3 %
NEUTROPHILS # BLD AUTO: 4.1 10E9/L (ref 1.6–8.3)
NEUTROPHILS NFR BLD AUTO: 73.9 %
PLATELET # BLD AUTO: 270 10E9/L (ref 150–450)
RBC # BLD AUTO: 3.97 10E12/L (ref 3.8–5.2)
WBC # BLD AUTO: 5.5 10E9/L (ref 4–11)

## 2019-10-08 PROCEDURE — 84460 ALANINE AMINO (ALT) (SGPT): CPT | Performed by: NURSE PRACTITIONER

## 2019-10-08 PROCEDURE — 36415 COLL VENOUS BLD VENIPUNCTURE: CPT | Performed by: NURSE PRACTITIONER

## 2019-10-08 PROCEDURE — 84450 TRANSFERASE (AST) (SGOT): CPT | Performed by: NURSE PRACTITIONER

## 2019-10-08 PROCEDURE — 85025 COMPLETE CBC W/AUTO DIFF WBC: CPT | Performed by: NURSE PRACTITIONER

## 2019-10-09 LAB
ALT SERPL W P-5'-P-CCNC: 31 U/L (ref 0–50)
AST SERPL W P-5'-P-CCNC: 29 U/L (ref 0–45)

## 2019-11-07 ENCOUNTER — OFFICE VISIT (OUTPATIENT)
Dept: NEUROLOGY | Facility: CLINIC | Age: 64
End: 2019-11-07
Attending: PSYCHIATRY & NEUROLOGY
Payer: COMMERCIAL

## 2019-11-07 VITALS
DIASTOLIC BLOOD PRESSURE: 73 MMHG | HEART RATE: 81 BPM | BODY MASS INDEX: 20.43 KG/M2 | SYSTOLIC BLOOD PRESSURE: 119 MMHG | WEIGHT: 122.6 LBS | HEIGHT: 65 IN

## 2019-11-07 DIAGNOSIS — G35 MS (MULTIPLE SCLEROSIS) (H): Primary | ICD-10-CM

## 2019-11-07 DIAGNOSIS — G35 MS (MULTIPLE SCLEROSIS) (H): ICD-10-CM

## 2019-11-07 LAB
ALBUMIN SERPL-MCNC: 4.3 G/DL (ref 3.4–5)
ALP SERPL-CCNC: 63 U/L (ref 40–150)
ALT SERPL W P-5'-P-CCNC: 44 U/L (ref 0–50)
AST SERPL W P-5'-P-CCNC: 29 U/L (ref 0–45)
BASOPHILS # BLD AUTO: 0 10E9/L (ref 0–0.2)
BASOPHILS NFR BLD AUTO: 0.5 %
BILIRUB DIRECT SERPL-MCNC: 0.1 MG/DL (ref 0–0.2)
BILIRUB SERPL-MCNC: 0.5 MG/DL (ref 0.2–1.3)
DIFFERENTIAL METHOD BLD: ABNORMAL
EOSINOPHIL # BLD AUTO: 0 10E9/L (ref 0–0.7)
EOSINOPHIL NFR BLD AUTO: 0.5 %
ERYTHROCYTE [DISTWIDTH] IN BLOOD BY AUTOMATED COUNT: 12 % (ref 10–15)
HCT VFR BLD AUTO: 38.7 % (ref 35–47)
HGB BLD-MCNC: 13.3 G/DL (ref 11.7–15.7)
IMM GRANULOCYTES # BLD: 0 10E9/L (ref 0–0.4)
IMM GRANULOCYTES NFR BLD: 0.3 %
LYMPHOCYTES # BLD AUTO: 0.7 10E9/L (ref 0.8–5.3)
LYMPHOCYTES NFR BLD AUTO: 17 %
MCH RBC QN AUTO: 31 PG (ref 26.5–33)
MCHC RBC AUTO-ENTMCNC: 34.4 G/DL (ref 31.5–36.5)
MCV RBC AUTO: 90 FL (ref 78–100)
MONOCYTES # BLD AUTO: 0.6 10E9/L (ref 0–1.3)
MONOCYTES NFR BLD AUTO: 15.8 %
NEUTROPHILS # BLD AUTO: 2.6 10E9/L (ref 1.6–8.3)
NEUTROPHILS NFR BLD AUTO: 65.9 %
NRBC # BLD AUTO: 0 10*3/UL
NRBC BLD AUTO-RTO: 0 /100
PLATELET # BLD AUTO: 281 10E9/L (ref 150–450)
PROT SERPL-MCNC: 7.6 G/DL (ref 6.8–8.8)
RBC # BLD AUTO: 4.29 10E12/L (ref 3.8–5.2)
TSH SERPL DL<=0.005 MIU/L-ACNC: 2.02 MU/L (ref 0.4–4)
WBC # BLD AUTO: 4 10E9/L (ref 4–11)

## 2019-11-07 PROCEDURE — 84443 ASSAY THYROID STIM HORMONE: CPT | Performed by: PSYCHIATRY & NEUROLOGY

## 2019-11-07 PROCEDURE — 80076 HEPATIC FUNCTION PANEL: CPT | Performed by: PSYCHIATRY & NEUROLOGY

## 2019-11-07 PROCEDURE — G0463 HOSPITAL OUTPT CLINIC VISIT: HCPCS | Mod: ZF

## 2019-11-07 PROCEDURE — 36415 COLL VENOUS BLD VENIPUNCTURE: CPT | Performed by: PSYCHIATRY & NEUROLOGY

## 2019-11-07 PROCEDURE — 85025 COMPLETE CBC W/AUTO DIFF WBC: CPT | Performed by: PSYCHIATRY & NEUROLOGY

## 2019-11-07 ASSESSMENT — PATIENT HEALTH QUESTIONNAIRE - PHQ9: SUM OF ALL RESPONSES TO PHQ QUESTIONS 1-9: 7

## 2019-11-07 ASSESSMENT — PAIN SCALES - GENERAL: PAINLEVEL: MILD PAIN (3)

## 2019-11-07 ASSESSMENT — MIFFLIN-ST. JEOR: SCORE: 1116.99

## 2019-11-07 NOTE — LETTER
11/7/2019      RE: Sejal Barroso  [address]  [place] [address]     Referral source: Established patient     Chief complaint:  Multiple sclerosis     History of the Present Illness:  Ms. Sejal Barroso is a 64-year-old woman who returns to the Multiple Sclerosis Clinic today for a regularly scheduled follow-up visit.      The patient's history is as per my previous notes.  Initial onset of symptoms of demyelinating disease was in 2008 when she developed right leg weakness and was found to have an enhancing lesion in the lower thoracic spinal cord.  Subsequently, she had a couple of episodes of new onset lancinating neuropathic pain, including an episode of trigeminal neuralgia as well as left-sided neck pain in 2015.  Most recently, in April 2019 the patient developed new incoordination of the right hand, weakness of the right leg, and a dysesthetic sensation in the right torso in association with a new enhancing lesion in the right cervical cord.  The patient was treated with high dose steroids and then elected to start disease-modifying therapy with the Rebif formulation of interferon beta.  She did have initial difficulty with headache and dizzy sensation after starting the medication and remained on the 22 mcg dose for longer than the usual upward titration, but did eventually achieve the goal of 44 mcg dose, on which she remains.      Today, she denies any new changes in vision, balance, strength or sensation suggestive of new relapse of multiple sclerosis since she was last seen in this clinic.  Symptomatically, she described headache and also relates that her balance seems worse.      She scored 7 on the PHQ-9 screening instrument for depression this morning.  I asked her about mood and she acknowledges that her mood is down, although she associates this more with the relapse in April rather than initiation of the current medication.      The plan when she was last seen by our nurse  practitioner in June was for her to have had MRI scan of the brain performed prior to that visit, but apparently this was not scheduled.      PHYSICAL EXAMINATION:   VITAL SIGNS:  Blood pressure 119/73; pulse 81; weight 55.6 kg; height 1.65 meters.   GENERAL:  Well-nourished woman who presents to the evaluation alone, awake and alert and in no acute distress.      Assessment/plan:    1.  Relapsing-remitting multiple sclerosis  I discussed with the patient that I am concerned about her tolerance of Rebif.  This may conceivably be contributing to several of her symptoms, including headache as well as depression, although regarding the latter she associates decline in mood more so with the appearance of functionally limiting symptoms in April rather than to the medication itself.      We spent some time discussing the possibility of alternate disease-modifying medications for multiple sclerosis.  The options would include switching her to another first line injectable or oral medication, although as she notes, if we change her to another similar medication it will be a number of months more before we have any sense of whether this medication is doing anything for her or not.  Alternatively, we could consider the use of a more potent therapy such as natalizumab or a B-cell depleting agent, but these parenteral therapies are associated with higher potential for risk as well as being less convenient.      After our discussion, it is her preference to continue with Rebif for now and try to get a sense of whether this medication is working for her or not, which is reasonable.  We will go ahead and check routine laboratory studies today to include blood counts, liver function tests and thyroid stimulating hormone.  We will obtain an MRI scan of the brain at her convenience for comparison to study performed earlier this year.      I will see her back for a review in 6 months, or sooner if she has new symptoms or other  concerns.      I spent 33 minutes with the patient in the office today, with greater than 50% of this time spent in counseling.     Cc:  Jay Zacarias MD (Gynecology)  Patient

## 2019-11-07 NOTE — PATIENT INSTRUCTIONS
1. MRI scan of the brain at your convenience    2. Continue Rebif for now    3. Blood tests today    4. Return to clinic in 6 months

## 2019-11-27 ENCOUNTER — TELEPHONE (OUTPATIENT)
Dept: NEUROLOGY | Facility: CLINIC | Age: 64
End: 2019-11-27

## 2019-11-27 NOTE — PROGRESS NOTES
Date of service: November 7, 2019     Referral source: Established patient     Chief complaint:  Multiple sclerosis     History of the Present Illness:  Ms. Sejal Barroso is a 64-year-old woman who returns to the Multiple Sclerosis Clinic today for a regularly scheduled follow-up visit.      The patient's history is as per my previous notes.  Initial onset of symptoms of demyelinating disease was in 2008 when she developed right leg weakness and was found to have an enhancing lesion in the lower thoracic spinal cord.  Subsequently, she had a couple of episodes of new onset lancinating neuropathic pain, including an episode of trigeminal neuralgia as well as left-sided neck pain in 2015.  Most recently, in April 2019 the patient developed new incoordination of the right hand, weakness of the right leg, and a dysesthetic sensation in the right torso in association with a new enhancing lesion in the right cervical cord.  The patient was treated with high dose steroids and then elected to start disease-modifying therapy with the Rebif formulation of interferon beta.  She did have initial difficulty with headache and dizzy sensation after starting the medication and remained on the 22 mcg dose for longer than the usual upward titration, but did eventually achieve the goal of 44 mcg dose, on which she remains.      Today, she denies any new changes in vision, balance, strength or sensation suggestive of new relapse of multiple sclerosis since she was last seen in this clinic.  Symptomatically, she described headache and also relates that her balance seems worse.      She scored 7 on the PHQ-9 screening instrument for depression this morning.  I asked her about mood and she acknowledges that her mood is down, although she associates this more with the relapse in April rather than initiation of the current medication.      The plan when she was last seen by our nurse practitioner in June was for her to have had MRI scan of  the brain performed prior to that visit, but apparently this was not scheduled.      PHYSICAL EXAMINATION:   VITAL SIGNS:  Blood pressure 119/73; pulse 81; weight 55.6 kg; height 1.65 meters.   GENERAL:  Well-nourished woman who presents to the evaluation alone, awake and alert and in no acute distress.      Assessment/plan:    1.  Relapsing-remitting multiple sclerosis  I discussed with the patient that I am concerned about her tolerance of Rebif.  This may conceivably be contributing to several of her symptoms, including headache as well as depression, although regarding the latter she associates decline in mood more so with the appearance of functionally limiting symptoms in April rather than to the medication itself.      We spent some time discussing the possibility of alternate disease-modifying medications for multiple sclerosis.  The options would include switching her to another first line injectable or oral medication, although as she notes, if we change her to another similar medication it will be a number of months more before we have any sense of whether this medication is doing anything for her or not.  Alternatively, we could consider the use of a more potent therapy such as natalizumab or a B-cell depleting agent, but these parenteral therapies are associated with higher potential for risk as well as being less convenient.      After our discussion, it is her preference to continue with Rebif for now and try to get a sense of whether this medication is working for her or not, which is reasonable.  We will go ahead and check routine laboratory studies today to include blood counts, liver function tests and thyroid stimulating hormone.  We will obtain an MRI scan of the brain at her convenience for comparison to study performed earlier this year.      I will see her back for a review in 6 months, or sooner if she has new symptoms or other concerns.      I spent 33 minutes with the patient in the office  today, with greater than 50% of this time spent in counseling.         SAIGE ALVARES MD             D: 2019   T: 2019   MT: nh      Name:     JOHN PALMER   MRN:      [id number]        Account:      [id number]   :      1955           Service Date: 2019      Document: [id number]

## 2019-11-27 NOTE — TELEPHONE ENCOUNTER
Per Dr. Salazar, patient is to have brain MRI at her convenience (certainly before her appointment in May).  Called patient to discuss; left ROBERT requesting a call back to discuss.

## 2019-12-06 ENCOUNTER — HOSPITAL ENCOUNTER (OUTPATIENT)
Dept: MRI IMAGING | Facility: CLINIC | Age: 64
Discharge: HOME | End: 2019-12-06
Attending: PSYCHIATRY & NEUROLOGY | Admitting: PSYCHIATRY & NEUROLOGY
Payer: COMMERCIAL

## 2019-12-06 DIAGNOSIS — G35 MS (MULTIPLE SCLEROSIS) (H): ICD-10-CM

## 2019-12-06 PROCEDURE — 70553 MRI BRAIN STEM W/O & W/DYE: CPT

## 2019-12-06 PROCEDURE — A9585 GADOBUTROL INJECTION: HCPCS | Performed by: PSYCHIATRY & NEUROLOGY

## 2019-12-06 PROCEDURE — 25500064 ZZH RX 255 OP 636: Performed by: PSYCHIATRY & NEUROLOGY

## 2019-12-06 RX ORDER — GADOBUTROL 604.72 MG/ML
7.5 INJECTION INTRAVENOUS ONCE
Status: COMPLETED | OUTPATIENT
Start: 2019-12-06 | End: 2019-12-06

## 2019-12-06 RX ADMIN — GADOBUTROL 7.5 ML: 604.72 INJECTION INTRAVENOUS at 08:14

## 2019-12-07 ENCOUNTER — MYC MEDICAL ADVICE (OUTPATIENT)
Dept: NEUROLOGY | Facility: CLINIC | Age: 64
End: 2019-12-07

## 2020-04-09 ENCOUNTER — DOCUMENTATION ONLY (OUTPATIENT)
Dept: NEUROLOGY | Facility: CLINIC | Age: 65
End: 2020-04-09

## 2020-04-09 NOTE — PROGRESS NOTES
I was notified by the [facility] nurse that upon her recent virtual visit with Jud, the patient noted she has been struggling with depression since her mother passed away in December; She does not have any suicidal ideation; The patient also states she continues to struggle with dizziness and her balance; Will notify Dr. Salazar; The patient is scheduled with Dr. Salazar on 5/7/20.    Nano Eckert MS RN Care Coordinator

## 2020-04-14 ENCOUNTER — TELEPHONE (OUTPATIENT)
Dept: NEUROLOGY | Facility: CLINIC | Age: 65
End: 2020-04-14

## 2020-04-14 DIAGNOSIS — G35 MULTIPLE SCLEROSIS (H): ICD-10-CM

## 2020-04-14 NOTE — TELEPHONE ENCOUNTER
Walt from [facility] [phone] called to clarify the strength of the requested medication. Walt stated that patient has been getting the 44mcg and PA is on file for this. However, the request is for 22mcg. He is wanting to confirm.

## 2020-04-14 NOTE — TELEPHONE ENCOUNTER
Patient has a PA on file for Rebif 44MCG/0.5ML. RX for Rebif 22MCG/0.5ML was ordered. Submitted PA for 22MCG/0.5ML. Walt from [employer] is calling to verify MCG. (phone # [phone])    Thank you,    Beverley [facility]  Specialty Pharmacy Clinic [facility] Surgery [address]  [address] [place] [address]  Ph: [phone] Fax: [phone]  [email or web address]

## 2020-04-14 NOTE — TELEPHONE ENCOUNTER
PA Initiation    Medication: Rebif 22MCG/0.5ML Syringe (PENDING)  Insurance Company: [employer] - Phone [phone] Fax [phone]  Pharmacy Filling the Rx: [facility] - [place] [address] [address]  Filling Pharmacy Phone:    Filling Pharmacy Fax:    Start Date: 4/14/2020

## 2020-04-14 NOTE — TELEPHONE ENCOUNTER
Received refill request for REBIF from [facility]; Patient was last seen on 11/7/2019 and has follow up appointment on 5/7/2020 with Dr. Ernst  Pended to [facility] pool for review/approval    Bonita Calvo MA

## 2020-04-15 NOTE — TELEPHONE ENCOUNTER
[facility] Call Center    Phone Message    May a detailed message be left on voicemail: no     Reason for Call: Other: Pt, Sejal called to talk to Keyanna .  She is taking the interferon beta-1a (REBIF).  She is taking the 44/ Teal needle versus the 22 which is yellow in color.        Comments: Call : [phone]    Action Taken: Message routed to:  [facility] ([facility] Neurology    Travel Screening: Not Applicable

## 2020-04-15 NOTE — TELEPHONE ENCOUNTER
Walt from [employer] called back stating he got a call from [employer] this morning stating that have a new script for Rebif and would like the person working on the PA to call him back so he can put in the auth in today and to make sure everything is correct.  Walt's phone # [phone].

## 2020-04-16 NOTE — TELEPHONE ENCOUNTER
[facility] Call Center    Phone Message    May a detailed message be left on voicemail: yes     Reason for Call: Other: Per call from Pharmacy received two Rebif RX, one for 44mcg and another for 22mcg. Pharmacy is requesting a call back to confirm the correct RX.     Action Taken: Message routed to:  [facility] Neurology    Travel Screening: Not Applicable

## 2020-04-16 NOTE — TELEPHONE ENCOUNTER
PA Initiation    Medication: Rebif 44MCG/0.5ML Syringe (PENDING)  Insurance Company: [employer] - Phone [phone] Fax [phone]  Pharmacy Filling the Rx: [facility] [place], [place] - [address]  Filling Pharmacy Phone:    Filling Pharmacy Fax:    Start Date: 4/14/2020    Resubmitted PA for Rebif 44MCG/0.5ML.

## 2020-05-07 ENCOUNTER — VIRTUAL VISIT (OUTPATIENT)
Dept: NEUROLOGY | Facility: CLINIC | Age: 65
End: 2020-05-07
Attending: PSYCHIATRY & NEUROLOGY
Payer: COMMERCIAL

## 2020-05-07 DIAGNOSIS — G35 MS (MULTIPLE SCLEROSIS) (H): Primary | ICD-10-CM

## 2020-05-07 DIAGNOSIS — F32.A DEPRESSION, UNSPECIFIED DEPRESSION TYPE: ICD-10-CM

## 2020-05-07 NOTE — PATIENT INSTRUCTIONS
1. Continue Rebif for now    2. Please have blood tests drawn at your convenience    3. We will see you back in six months with MRI scans of the brain and cervical spine

## 2020-05-07 NOTE — LETTER
5/7/2020      RE: Sejal Barroso  [address]  [place] [address]     Referral source: Established patient    Chief complaint: Multiple sclerosis    History of the Present Illness: Ms. Sejal Barroso is a 64 year old woman who is evaluated in the Multiple Sclerosis Clinic today for scheduled follow up visit regarding her diagnosis of multiple sclerosis.    Due to the recommendations of public health authorities, previously scheduled office visit was converted to a telephone visit to limit unnecessary exposure in light of the COVID-19 pandemic.    The patient's history is as per my previous notes.  Initial onset of symptoms of demyelinating disease was in 2008 when she developed right leg weakness and was found to have an enhancing lesion in the lower thoracic spinal cord.  Subsequently, she had a couple of episodes of new onset lancinating neuropathic pain, including an episode of trigeminal neuralgia as well as left-sided neck pain in 2015.  Most recently, in April 2019 the patient developed new incoordination of the right hand, weakness of the right leg, and a dysesthetic sensation in the right torso in association with a new enhancing lesion in the right cervical cord.  The patient was treated with high dose steroids and then elected to start disease-modifying therapy with the Rebif formulation of interferon beta.  She did have initial difficulty with headache and dizzy sensation after starting the medication and remained on the 22 mcg dose for longer than the usual upward titration, but did eventually achieve the goal of 44 mcg dose, on which she remains.     Today, she denies any new episodic changes in vision, balance, strength or sensation suggestive of new MS relapse since her last visit to this clinic. I also reviewed with her the results of brain MRI performed last December, which was stable with no new lesions in comparison to previous study of April 2019, and no contrast enhancement to  "suggest active inflammation.    Symptomatically, I discussed concern about depression raised by the patient previously. She relates that she has had a number of personal losses over the past year, including the unexpected deaths of both parents as well as her ex-. She also relates that she is emotionally stressed by the COVID-19 pandemic and that \"everyone is depressed now\". However, she does not note any association between her mood and use of Rebif.    On the other hand, she does note a dizzy sensation that she does feel is consistently related to Rebif. She does not describe vertigo, lightheadedness, or incoordination of the limbs and describes this sensation as \"plain old dizziness\".    She also relates that motor fatigue is tending to develop more quickly over time.    Past Medical History:  1.  Migraine.   2.  Osteopenia.   3.  Status post tonsillectomy.   4.  Status post  section x3.     Physical Examination: The patient is awake, alert and oriented. Mental status appears to be within age appropriate normal limits. There is no evident dysarthria.    Remainder of neurological examination cannot be completed via telephone visit.    Assessment/plan:    1. Multiple sclerosis  The patient is clinically stable with no evidence of active inflammatory demyelination on current disease modifying therapy with Rebif. However, it appears to me that her tolerance of the medication is relatively poor due to chronic fatigue and exacerbation of sense of dizziness that seems to correspond to initiation of the medication.     I discussed alternative treatments including glatiramer acetate, teriflunomide and parenteral therapies with her at length, but at present she is not ready to commit to any change.     I advised her to have routine laboratory tests for monitoring of Rebif completed at her convenience, including blood counts and liver function tests. I will also check a vitamin D level and advise her on " supplementation of vitamin D as needed to maintain her level within the goal range of 60-80 mcg/L.    I will see her back in six months with MRI scans of the brain and cervical spine prior to the visit. I discussed with her that if this demonstrates any additional lesion accumulation, we would more strongly recommend changing to a different agent.    2. Depression  There is a significant situational component. She does not feel that this is related to Rebif.    Phone call duration: 53 minutes    Cc:  Jay Zacarias MD (Gynecology)  Patient

## 2020-05-07 NOTE — PROGRESS NOTES
"Sejal Barroso is a 64 year old female who is being evaluated via a billable telephone visit.      The patient has been notified of following:     \"This telephone visit will be conducted via a call between you and your physician/provider. We have found that certain health care needs can be provided without the need for a physical exam.  This service lets us provide the care you need with a short phone conversation.  If a prescription is necessary we can send it directly to your pharmacy.  If lab work is needed we can place an order for that and you can then stop by our lab to have the test done at a later time.    Telephone visits are billed at different rates depending on your insurance coverage. During this emergency period, for some insurers they may be billed the same as an in-person visit.  Please reach out to your insurance provider with any questions.    If during the course of the call the physician/provider feels a telephone visit is not appropriate, you will not be charged for this service.\"    Patient has given verbal consent for Telephone visit?  Yes    What phone number would you like to be contacted at? [phone]    How would you like to obtain your AVS? [facility]     Provider notes:     Referral source: Established patient    Chief complaint: Multiple sclerosis    History of the Present Illness: Ms. Sejal Barroso is a 64 year old woman who is evaluated in the Multiple Sclerosis Clinic today for scheduled follow up visit regarding her diagnosis of multiple sclerosis.    Due to the recommendations of public health authorities, previously scheduled office visit was converted to a telephone visit to limit unnecessary exposure in light of the COVID-19 pandemic.    The patient's history is as per my previous notes.  Initial onset of symptoms of demyelinating disease was in 2008 when she developed right leg weakness and was found to have an enhancing lesion in the lower thoracic spinal cord.  " "Subsequently, she had a couple of episodes of new onset lancinating neuropathic pain, including an episode of trigeminal neuralgia as well as left-sided neck pain in 2015.  Most recently, in April 2019 the patient developed new incoordination of the right hand, weakness of the right leg, and a dysesthetic sensation in the right torso in association with a new enhancing lesion in the right cervical cord.  The patient was treated with high dose steroids and then elected to start disease-modifying therapy with the Rebif formulation of interferon beta.  She did have initial difficulty with headache and dizzy sensation after starting the medication and remained on the 22 mcg dose for longer than the usual upward titration, but did eventually achieve the goal of 44 mcg dose, on which she remains.     Today, she denies any new episodic changes in vision, balance, strength or sensation suggestive of new MS relapse since her last visit to this clinic. I also reviewed with her the results of brain MRI performed last December, which was stable with no new lesions in comparison to previous study of April 2019, and no contrast enhancement to suggest active inflammation.    Symptomatically, I discussed concern about depression raised by the patient previously. She relates that she has had a number of personal losses over the past year, including the unexpected deaths of both parents as well as her ex-. She also relates that she is emotionally stressed by the COVID-19 pandemic and that \"everyone is depressed now\". However, she does not note any association between her mood and use of Rebif.    On the other hand, she does note a dizzy sensation that she does feel is consistently related to Rebif. She does not describe vertigo, lightheadedness, or incoordination of the limbs and describes this sensation as \"plain old dizziness\".    She also relates that motor fatigue is tending to develop more quickly over time.    Past " Medical History:  1.  Migraine.   2.  Osteopenia.   3.  Status post tonsillectomy.   4.  Status post  section x3.     Physical Examination: The patient is awake, alert and oriented. Mental status appears to be within age appropriate normal limits. There is no evident dysarthria.    Remainder of neurological examination cannot be completed via telephone visit.    Assessment/plan:    1. Multiple sclerosis  The patient is clinically stable with no evidence of active inflammatory demyelination on current disease modifying therapy with Rebif. However, it appears to me that her tolerance of the medication is relatively poor due to chronic fatigue and exacerbation of sense of dizziness that seems to correspond to initiation of the medication.     I discussed alternative treatments including glatiramer acetate, teriflunomide and parenteral therapies with her at length, but at present she is not ready to commit to any change.     I advised her to have routine laboratory tests for monitoring of Rebif completed at her convenience, including blood counts and liver function tests. I will also check a vitamin D level and advise her on supplementation of vitamin D as needed to maintain her level within the goal range of 60-80 mcg/L.    I will see her back in six months with MRI scans of the brain and cervical spine prior to the visit. I discussed with her that if this demonstrates any additional lesion accumulation, we would more strongly recommend changing to a different agent.    2. Depression  There is a significant situational component. She does not feel that this is related to Rebif.    Phone call duration: 53 minutes

## 2020-11-07 ENCOUNTER — HEALTH MAINTENANCE LETTER (OUTPATIENT)
Age: 65
End: 2020-11-07

## 2020-11-12 ENCOUNTER — TELEPHONE (OUTPATIENT)
Dept: NEUROLOGY | Facility: CLINIC | Age: 65
End: 2020-11-12

## 2020-11-12 DIAGNOSIS — G35 MULTIPLE SCLEROSIS (H): Primary | ICD-10-CM

## 2020-11-12 NOTE — TELEPHONE ENCOUNTER
[facility] Call Center    Phone Message    May a detailed message be left on voicemail: yes     Reason for Call: Other: Patient calling to get orders for blood test for Dr. Salazar states that she believes these are  and wants to get orders extended so she can go get blood tests done.     Please advise and call patient back to confirm     Action Taken: Other:  MS    Travel Screening: Not Applicable

## 2020-11-12 NOTE — TELEPHONE ENCOUNTER
Extended lab orders and added TSH lab per Dr. Salazar.  Will notify patient to get labs done and schedule appointment.     Junie Ojeda RN

## 2020-11-19 DIAGNOSIS — G35 MULTIPLE SCLEROSIS (H): ICD-10-CM

## 2020-11-19 DIAGNOSIS — G35 MS (MULTIPLE SCLEROSIS) (H): ICD-10-CM

## 2020-11-19 LAB
BASOPHILS # BLD AUTO: 0 10E9/L (ref 0–0.2)
BASOPHILS NFR BLD AUTO: 0.2 %
DIFFERENTIAL METHOD BLD: NORMAL
EOSINOPHIL # BLD AUTO: 0 10E9/L (ref 0–0.7)
EOSINOPHIL NFR BLD AUTO: 0.5 %
ERYTHROCYTE [DISTWIDTH] IN BLOOD BY AUTOMATED COUNT: 11.9 % (ref 10–15)
HCT VFR BLD AUTO: 38.1 % (ref 35–47)
HGB BLD-MCNC: 13.4 G/DL (ref 11.7–15.7)
LYMPHOCYTES # BLD AUTO: 0.8 10E9/L (ref 0.8–5.3)
LYMPHOCYTES NFR BLD AUTO: 13.2 %
MCH RBC QN AUTO: 31.6 PG (ref 26.5–33)
MCHC RBC AUTO-ENTMCNC: 35.2 G/DL (ref 31.5–36.5)
MCV RBC AUTO: 90 FL (ref 78–100)
MONOCYTES # BLD AUTO: 0.8 10E9/L (ref 0–1.3)
MONOCYTES NFR BLD AUTO: 12.5 %
NEUTROPHILS # BLD AUTO: 4.4 10E9/L (ref 1.6–8.3)
NEUTROPHILS NFR BLD AUTO: 73.6 %
PLATELET # BLD AUTO: 283 10E9/L (ref 150–450)
RBC # BLD AUTO: 4.24 10E12/L (ref 3.8–5.2)
WBC # BLD AUTO: 6 10E9/L (ref 4–11)

## 2020-11-19 PROCEDURE — 36415 COLL VENOUS BLD VENIPUNCTURE: CPT | Performed by: PSYCHIATRY & NEUROLOGY

## 2020-11-19 PROCEDURE — 84443 ASSAY THYROID STIM HORMONE: CPT | Performed by: PSYCHIATRY & NEUROLOGY

## 2020-11-19 PROCEDURE — 82306 VITAMIN D 25 HYDROXY: CPT | Performed by: PSYCHIATRY & NEUROLOGY

## 2020-11-19 PROCEDURE — 80076 HEPATIC FUNCTION PANEL: CPT | Performed by: PSYCHIATRY & NEUROLOGY

## 2020-11-19 PROCEDURE — 85025 COMPLETE CBC W/AUTO DIFF WBC: CPT | Performed by: PSYCHIATRY & NEUROLOGY

## 2020-11-20 LAB
ALBUMIN SERPL-MCNC: 4.2 G/DL (ref 3.4–5)
ALP SERPL-CCNC: 71 U/L (ref 40–150)
ALT SERPL W P-5'-P-CCNC: 111 U/L (ref 0–50)
AST SERPL W P-5'-P-CCNC: 74 U/L (ref 0–45)
BILIRUB DIRECT SERPL-MCNC: 0.1 MG/DL (ref 0–0.2)
BILIRUB SERPL-MCNC: 0.5 MG/DL (ref 0.2–1.3)
DEPRECATED CALCIDIOL+CALCIFEROL SERPL-MC: 47 UG/L (ref 20–75)
PROT SERPL-MCNC: 7.9 G/DL (ref 6.8–8.8)
TSH SERPL DL<=0.005 MIU/L-ACNC: 3.15 MU/L (ref 0.4–4)

## 2020-12-22 ENCOUNTER — TELEPHONE (OUTPATIENT)
Dept: NEUROLOGY | Facility: CLINIC | Age: 65
End: 2020-12-22

## 2020-12-22 NOTE — TELEPHONE ENCOUNTER
Patient is due to have her LFTs done at this time; [employer] message sent to patient with a friendly reminder to have this done.    Nano Eckert, MS RN Care Coordinator

## 2021-01-04 NOTE — TELEPHONE ENCOUNTER
Patient read her [employer] message, but has not had her lab work done yet; [employer] message sent to patient with another reminder.    Nano Eckert, MS RN Care Coordinator

## 2021-01-08 DIAGNOSIS — R74.01 NONSPECIFIC ELEVATION OF LEVELS OF TRANSAMINASE OR LACTIC ACID DEHYDROGENASE (LDH): ICD-10-CM

## 2021-01-08 DIAGNOSIS — R74.02 NONSPECIFIC ELEVATION OF LEVELS OF TRANSAMINASE OR LACTIC ACID DEHYDROGENASE (LDH): ICD-10-CM

## 2021-01-08 PROCEDURE — 80076 HEPATIC FUNCTION PANEL: CPT | Performed by: PSYCHIATRY & NEUROLOGY

## 2021-01-08 PROCEDURE — 36415 COLL VENOUS BLD VENIPUNCTURE: CPT | Performed by: PSYCHIATRY & NEUROLOGY

## 2021-01-09 LAB
ALBUMIN SERPL-MCNC: 4.3 G/DL (ref 3.4–5)
ALP SERPL-CCNC: 74 U/L (ref 40–150)
ALT SERPL W P-5'-P-CCNC: 71 U/L (ref 0–50)
AST SERPL W P-5'-P-CCNC: 46 U/L (ref 0–45)
BILIRUB DIRECT SERPL-MCNC: 0.1 MG/DL (ref 0–0.2)
BILIRUB SERPL-MCNC: 0.4 MG/DL (ref 0.2–1.3)
PROT SERPL-MCNC: 7.7 G/DL (ref 6.8–8.8)

## 2021-01-11 NOTE — TELEPHONE ENCOUNTER
Patient completed her lab work; Will route to Dr. Salazar.    Nano Eckert, MS RN Care Coordinator

## 2021-01-28 ENCOUNTER — DOCUMENTATION ONLY (OUTPATIENT)
Dept: NEUROLOGY | Facility: CLINIC | Age: 66
End: 2021-01-28

## 2021-01-28 ENCOUNTER — MEDICAL CORRESPONDENCE (OUTPATIENT)
Dept: HEALTH INFORMATION MANAGEMENT | Facility: CLINIC | Age: 66
End: 2021-01-28

## 2021-01-28 NOTE — PROGRESS NOTES
The Rebif nurse reached out to me stating that if Jud is staying on Rebif that she would like to change over to the Rebiject device from the [employer]bidose; I talked with Dr. Salazar and she is continuing Rebif; Start form for delivery method change filled out and placed in Dr. Salazar's folder for review and signature.    Nano Eckert, MS RN Care Coordinator

## 2021-01-30 ENCOUNTER — HEALTH MAINTENANCE LETTER (OUTPATIENT)
Age: 66
End: 2021-01-30

## 2021-02-02 ENCOUNTER — TELEPHONE (OUTPATIENT)
Dept: NEUROLOGY | Facility: CLINIC | Age: 66
End: 2021-02-02

## 2021-02-02 NOTE — TELEPHONE ENCOUNTER
Form was received and it is in Dr. Salazar's folder for signature later this week when he is in clinic.    Nano Eckert, MS RN Care Coordinator

## 2021-02-02 NOTE — TELEPHONE ENCOUNTER
[facility] Call Center    Phone Message    May a detailed message be left on voicemail: yes     Reason for Call: Other: wanting to confirm if the rebiject form was received for patient to obtain the rebiject device.  IF the form wasn't received, please call [facility] [employer] to provider verbal authorization for the rebiject.Can spk to anyone at the [facility] [employer] [phone]    Action Taken: Message routed to:  Adult Clinics: Neurology p [id number]    Travel Screening: Not Applicable

## 2021-02-24 DIAGNOSIS — G35 MULTIPLE SCLEROSIS (H): ICD-10-CM

## 2021-02-25 ENCOUNTER — TELEPHONE (OUTPATIENT)
Dept: NEUROLOGY | Facility: CLINIC | Age: 66
End: 2021-02-25

## 2021-02-25 RX ORDER — INTERFERON BETA-1A 44 UG/.5ML
INJECTION, SOLUTION SUBCUTANEOUS
Qty: 6 ML | Refills: 11 | Status: SHIPPED | OUTPATIENT
Start: 2021-02-25 | End: 2022-12-15

## 2021-02-25 NOTE — TELEPHONE ENCOUNTER
Received refill request for Rebif from [facility]; Patient was last seen in May and has no follow up appointment with Dr. Salazar; Refilled per [facility] refill protocol; I will ask our team to call the patient to schedule a follow up appointment with Dr. Salazar.    Nano Eckert MS RN Care Coordinator

## 2021-02-25 NOTE — TELEPHONE ENCOUNTER
PA Initiation    Medication: Rebif 44MCG/0.5ML syringes (PENDING)  Insurance Company: [employer] - Phone [phone] Fax [phone]  Pharmacy Filling the Rx: [facility] [place], [place] - [address]  Filling Pharmacy Phone:    Filling Pharmacy Fax:    Start Date: 2/25/2021        Thank you,    Beverley Jones [facility]  Specialty Pharmacy Clinic [facility] [address] [address]  Ph: [phone] Fax: [phone]  [email or web address]

## 2021-02-25 NOTE — TELEPHONE ENCOUNTER
Prior Authorization Approval    Authorization Effective Date: 2/25/2021  Authorization Expiration Date: 2/24/2022  Medication: Rebif 44MCG/0.5ML syringes (APPROVED)  Approved Dose/Quantity: 6ML/28 days  Reference #:     Insurance Company: [employer] - Phone [phone] Fax [phone]  Expected [employer]Pay:       [employer] Card Available: No    Foundation Assistance Needed:    Which Pharmacy is filling the prescription (Not needed for infusion/clinic administered[facility] [place] [place] [facility] [address]  Pharmacy Notified: Yes  Patient Notified: Yes

## 2021-07-08 ENCOUNTER — MYC MEDICAL ADVICE (OUTPATIENT)
Dept: NEUROLOGY | Facility: CLINIC | Age: 66
End: 2021-07-08

## 2021-07-08 DIAGNOSIS — G35 MULTIPLE SCLEROSIS (H): Primary | ICD-10-CM

## 2021-07-20 ENCOUNTER — LAB (OUTPATIENT)
Dept: LAB | Facility: CLINIC | Age: 66
End: 2021-07-20
Payer: COMMERCIAL

## 2021-07-20 DIAGNOSIS — G35 MULTIPLE SCLEROSIS (H): ICD-10-CM

## 2021-07-20 LAB
BASOPHILS # BLD AUTO: 0 10E3/UL (ref 0–0.2)
BASOPHILS NFR BLD AUTO: 0 %
EOSINOPHIL # BLD AUTO: 0 10E3/UL (ref 0–0.7)
EOSINOPHIL NFR BLD AUTO: 0 %
ERYTHROCYTE [DISTWIDTH] IN BLOOD BY AUTOMATED COUNT: 12.2 % (ref 10–15)
HCT VFR BLD AUTO: 36.2 % (ref 35–47)
HGB BLD-MCNC: 12.9 G/DL (ref 11.7–15.7)
LYMPHOCYTES # BLD AUTO: 1 10E3/UL (ref 0.8–5.3)
LYMPHOCYTES NFR BLD AUTO: 21 %
MCH RBC QN AUTO: 31.5 PG (ref 26.5–33)
MCHC RBC AUTO-ENTMCNC: 35.6 G/DL (ref 31.5–36.5)
MCV RBC AUTO: 88 FL (ref 78–100)
MONOCYTES # BLD AUTO: 0.8 10E3/UL (ref 0–1.3)
MONOCYTES NFR BLD AUTO: 17 %
NEUTROPHILS # BLD AUTO: 2.8 10E3/UL (ref 1.6–8.3)
NEUTROPHILS NFR BLD AUTO: 62 %
PLATELET # BLD AUTO: 229 10E3/UL (ref 150–450)
RBC # BLD AUTO: 4.1 10E6/UL (ref 3.8–5.2)
WBC # BLD AUTO: 4.5 10E3/UL (ref 4–11)

## 2021-07-20 PROCEDURE — 80076 HEPATIC FUNCTION PANEL: CPT

## 2021-07-20 PROCEDURE — 85025 COMPLETE CBC W/AUTO DIFF WBC: CPT

## 2021-07-20 PROCEDURE — 36415 COLL VENOUS BLD VENIPUNCTURE: CPT

## 2021-07-21 LAB
ALBUMIN SERPL-MCNC: 4.2 G/DL (ref 3.4–5)
ALP SERPL-CCNC: 64 U/L (ref 40–150)
ALT SERPL W P-5'-P-CCNC: 39 U/L (ref 0–50)
AST SERPL W P-5'-P-CCNC: 34 U/L (ref 0–45)
BILIRUB DIRECT SERPL-MCNC: 0.1 MG/DL (ref 0–0.2)
BILIRUB SERPL-MCNC: 0.6 MG/DL (ref 0.2–1.3)
PROT SERPL-MCNC: 7.9 G/DL (ref 6.8–8.8)

## 2021-07-22 ENCOUNTER — HOSPITAL ENCOUNTER (OUTPATIENT)
Dept: MRI IMAGING | Facility: CLINIC | Age: 66
End: 2021-07-22
Attending: PSYCHIATRY & NEUROLOGY
Payer: COMMERCIAL

## 2021-07-22 DIAGNOSIS — G35 MS (MULTIPLE SCLEROSIS) (H): ICD-10-CM

## 2021-07-22 PROCEDURE — 70553 MRI BRAIN STEM W/O & W/DYE: CPT

## 2021-07-22 PROCEDURE — A9585 GADOBUTROL INJECTION: HCPCS | Performed by: PSYCHIATRY & NEUROLOGY

## 2021-07-22 PROCEDURE — 72156 MRI NECK SPINE W/O & W/DYE: CPT

## 2021-07-22 PROCEDURE — 255N000002 HC RX 255 OP 636: Performed by: PSYCHIATRY & NEUROLOGY

## 2021-07-22 RX ORDER — GADOBUTROL 604.72 MG/ML
5 INJECTION INTRAVENOUS ONCE
Status: COMPLETED | OUTPATIENT
Start: 2021-07-22 | End: 2021-07-22

## 2021-07-22 RX ADMIN — GADOBUTROL 5 ML: 604.72 INJECTION INTRAVENOUS at 07:17

## 2021-09-05 ENCOUNTER — HEALTH MAINTENANCE LETTER (OUTPATIENT)
Age: 66
End: 2021-09-05

## 2021-09-09 ENCOUNTER — OFFICE VISIT (OUTPATIENT)
Dept: NEUROLOGY | Facility: CLINIC | Age: 66
End: 2021-09-09
Attending: PSYCHIATRY & NEUROLOGY
Payer: COMMERCIAL

## 2021-09-09 VITALS
SYSTOLIC BLOOD PRESSURE: 123 MMHG | HEART RATE: 78 BPM | WEIGHT: 114 LBS | BODY MASS INDEX: 18.97 KG/M2 | DIASTOLIC BLOOD PRESSURE: 78 MMHG | OXYGEN SATURATION: 98 %

## 2021-09-09 DIAGNOSIS — G35 MS (MULTIPLE SCLEROSIS) (H): Primary | ICD-10-CM

## 2021-09-09 DIAGNOSIS — R26.9 GAIT DISTURBANCE: ICD-10-CM

## 2021-09-09 PROCEDURE — 99215 OFFICE O/P EST HI 40 MIN: CPT | Performed by: PSYCHIATRY & NEUROLOGY

## 2021-09-09 PROCEDURE — G0463 HOSPITAL OUTPT CLINIC VISIT: HCPCS

## 2021-09-09 NOTE — PROGRESS NOTES
Date of service: September 9, 2019    Referral source: Established patient    Chief complaint: Multiple sclerosis    History of the Present Illness:  Ms. Sejal Barroso is a 66-year-old right-handed woman who returns to the [facility] today for a scheduled follow-up visit regarding her diagnosis of multiple sclerosis after earlier MRI scans of the brain and cervical spine.    The patient's history is as per my previous notes.  I met her initially at another institution back in 2008 at the time of initial onset of symptoms of demyelinating disease.  At that point, she developed right leg weakness and was found to have an enhancing lesion in the lower thoracic spinal cord.      Subsequently, I have been following her here at the [facility] for the past several years.  She had a couple of episodes of lancinating neuropathic pain in 2015.  More recently, in April 2019, she developed new incoordination of the right hand, weakness of the right leg and a dysesthetic sensation in the right torso associated with a new enhancing lesion in the cervical cord.  At that point, she elected to begin disease-modifying therapy with the Rebif formulation of interferon beta.    When I last saw her in 2020, she was reporting some difficulties with tolerance of this medication, although today she relates that with taking anti-inflammatory medications consistently prior to injections, these have substantially improved.    She also denies any new episodic changes in vision, balance, strength or sensation suggestive of new relapse of multiple sclerosis.  She denies any cognitive symptoms, which is obviously good news.  She does note some limitation in her right lower limb with prolonged ambulation, which becomes noticeable more quickly at this point than it did in the past.  She has started using a handicapped parking permit at larger stores over the past year or so.    PHYSICAL EXAMINATION:    VITAL SIGNS:  Blood  pressure 123/78; pulse 78; oxygen saturation 98%; weight 51.7 kg.  GENERAL:  Thin woman who presents to the examination alone, awake and alert and in no acute distress.    NEUROLOGIC EXAMINATION:    CRANIAL NERVES:  Visual fields are full to confrontation.  Extraocular movements are intact with no internuclear ophthalmoplegia.  Facial strength is normal.  Palate elevation and tongue protrusion are normal.  POWER:  Strength is normal in proximal and distal muscles in the upper limbs.  In the lower limbs, there is very mild weakness in an upper motor neuron pattern in the right leg, most apparent in the right hip flexors.  REFLEXES:  Reflexes are roughly symmetric and within normal limits in the arms and perhaps slightly increased, but also symmetric, in the legs.  MOTOR/CEREBELLAR:  There is no appendicular ataxia on finger-to-nose testing.  Rapid alternating movements are within normal limits in the extremities.  There is no pronator drift in the arms.  GAIT:  The patient is able to ambulate on a flat, level surface with no gross loss of postural stability.  Her gait is slightly wide based.  She can walk on toes.  Heel walking is impaired on the right side only, grading -2.    Investigations: I reviewed images from MRI scans of the brain and cervical spine performed on 07/22/2021.  There is no abnormal enhancement with gadolinium contrast throughout the brain and cord parenchyma.  Lesion in the right hemicord at C2-C3 shows substantial interval improvement and resolution of contrast enhancement in comparison to earlier MRI of 04/10/2019, and there were no new lesions in the cord parenchyma.  Likewise, several periventricular and juxtacortical foci of T2 hyperintensity in the white matter of the cerebral hemispheres are unchanged in comparison to the last brain MRI of 12/06/2019.  [place] of T1 hypointense lesions is also stable to my eye.  There are no new lesions.    Assessment/plan:     1.  Multiple sclerosis  The  patient is clinically and radiologically stable on current disease-modifying therapy with Rebif.  I am pleased that she is tolerating this medicine better than she was at her last visit and at present, she is content to maintain this.    She did have blood counts and liver function test last month, which were entirely normal.    I will plan to see her back in 1 year for a review.    2.  Gait disturbance  I counseled her on measures to avoid falling, such as using handrails at all times when ascending and descending stairs as well as exercising particular caution in winter weather conditions.  She should continue using handicapped parking as needed to help with energy conservation, as well.    I spent 45 minutes in patient care activities related to this encounter on the date of service, including time spent reviewing the chart, reviewing neuroimaging, obtaining history and examination from and in counseling the patient and in documentation in the electronic medical record.  I answered her several questions.    Marcelino Salazar MD        D: 2021   T: 2021   MT: GERARDO    Name:     JOHN PALMERRayshawn  MRN:      [id number]        Account:      [id number]   :      1955           Service Date: 2021       Document: [id number]

## 2021-09-09 NOTE — LETTER
9/9/2021      RE: Sejal Barroso  [address]  [place] [address]     Referral source: Established patient    Chief complaint: Multiple sclerosis    History of the Present Illness:  Ms. Sejal Barroso is a 66-year-old right-handed woman who returns to the [facility]clerosis Clinic today for a scheduled follow-up visit regarding her diagnosis of multiple sclerosis after earlier MRI scans of the brain and cervical spine.    The patient's history is as per my previous notes.  I met her initially at another institution back in 2008 at the time of initial onset of symptoms of demyelinating disease.  At that point, she developed right leg weakness and was found to have an enhancing lesion in the lower thoracic spinal cord.      Subsequently, I have been following her here at the [facility] for the past several years.  She had a couple of episodes of lancinating neuropathic pain in 2015.  More recently, in April 2019, she developed new incoordination of the right hand, weakness of the right leg and a dysesthetic sensation in the right torso associated with a new enhancing lesion in the cervical cord.  At that point, she elected to begin disease-modifying therapy with the Rebif formulation of interferon beta.    When I last saw her in 2020, she was reporting some difficulties with tolerance of this medication, although today she relates that with taking anti-inflammatory medications consistently prior to injections, these have substantially improved.    She also denies any new episodic changes in vision, balance, strength or sensation suggestive of new relapse of multiple sclerosis.  She denies any cognitive symptoms, which is obviously good news.  She does note some limitation in her right lower limb with prolonged ambulation, which becomes noticeable more quickly at this point than it did in the past.  She has started using a handicapped parking permit at larger [employer] over the past year or  so.    PHYSICAL EXAMINATION:    VITAL SIGNS:  Blood pressure 123/78; pulse 78; oxygen saturation 98%; weight 51.7 kg.  GENERAL:  Thin woman who presents to the examination alone, awake and alert and in no acute distress.    NEUROLOGIC EXAMINATION:    CRANIAL NERVES:  Visual fields are full to confrontation.  Extraocular movements are intact with no internuclear ophthalmoplegia.  Facial strength is normal.  Palate elevation and tongue protrusion are normal.  POWER:  Strength is normal in proximal and distal muscles in the upper limbs.  In the lower limbs, there is very mild weakness in an upper motor neuron pattern in the right leg, most apparent in the right hip flexors.  REFLEXES:  Reflexes are roughly symmetric and within normal limits in the arms and perhaps slightly increased, but also symmetric, in the legs.  MOTOR/CEREBELLAR:  There is no appendicular ataxia on finger-to-nose testing.  Rapid alternating movements are within normal limits in the extremities.  There is no pronator drift in the arms.  GAIT:  The patient is able to ambulate on a flat, level surface with no gross loss of postural stability.  Her gait is slightly wide based.  She can walk on toes.  Heel walking is impaired on the right side only, grading -2.    Investigations: I reviewed images from MRI scans of the brain and cervical spine performed on 07/22/2021.  There is no abnormal enhancement with gadolinium contrast throughout the brain and cord parenchyma.  Lesion in the right hemicord at C2-C3 shows substantial interval improvement and resolution of contrast enhancement in comparison to earlier MRI of 04/10/2019, and there were no new lesions in the cord parenchyma.  Likewise, several periventricular and juxtacortical foci of T2 hyperintensity in the white matter of the cerebral hemispheres are unchanged in comparison to the last brain MRI of 12/06/2019.  [place] of T1 hypointense lesions is also stable to my eye.  There are no new  lesions.    Assessment/plan:     1.  Multiple sclerosis  The patient is clinically and radiologically stable on current disease-modifying therapy with Rebif.  I am pleased that she is tolerating this medicine better than she was at her last visit and at present, she is content to maintain this.    She did have blood counts and liver function test last month, which were entirely normal.    I will plan to see her back in 1 year for a review.    2.  Gait disturbance  I counseled her on measures to avoid falling, such as using handrails at all times when ascending and descending stairs as well as exercising particular caution in winter weather conditions.  She should continue using handicapped parking as needed to help with energy conservation, as well.    I spent 45 minutes in patient care activities related to this encounter on the date of service, including time spent reviewing the chart, reviewing neuroimaging, obtaining history and examination from and in counseling the patient and in documentation in the electronic medical record.  I answered her several questions.    Marcelino Salazar MD   of Neurology  [facility]    Cc:  Jay Zacarias MD (Gynecology)  Patient

## 2021-12-02 ENCOUNTER — MEDICAL CORRESPONDENCE (OUTPATIENT)
Dept: HEALTH INFORMATION MANAGEMENT | Facility: CLINIC | Age: 66
End: 2021-12-02
Payer: COMMERCIAL

## 2021-12-08 ENCOUNTER — TELEPHONE (OUTPATIENT)
Dept: NEUROLOGY | Facility: CLINIC | Age: 66
End: 2021-12-08
Payer: COMMERCIAL

## 2021-12-08 NOTE — TELEPHONE ENCOUNTER
Spoke with [facility] to clarify delivery method. Pharmacist confirmed pt has been on Rebidose. Confirmed that pharmacy has the information needed to send Rebidose. They do have the updated start form that was faxed in to [employer] on 12/7. Also called Sejal to verify. She confirmed that she uses Rebidose, not the Rebiject auto injector. She also let me know that it has been approved by insurance and that she is approved for copay assistance through [employer]. Provided phone number for [facility] to pt so she can call and check on timing of shipment, as she is out of medication.    Jesusita Desai RN

## 2021-12-08 NOTE — TELEPHONE ENCOUNTER
[facility] Call Center    Phone Message    May a detailed message be left on voicemail: yes     Reason for Call: Radha from [facility] called. They received an RX for prefill REBIF syringe, However, pt has been on the auto injector REBIF. Please call to clarify.  [phone] ext [phone]    Action Taken: Message routed to:  [facility] ([facility]    Travel Screening: Not Applicable

## 2021-12-26 ENCOUNTER — HEALTH MAINTENANCE LETTER (OUTPATIENT)
Age: 66
End: 2021-12-26

## 2022-01-11 ENCOUNTER — TELEPHONE (OUTPATIENT)
Dept: NEUROLOGY | Facility: CLINIC | Age: 67
End: 2022-01-11
Payer: COMMERCIAL

## 2022-01-11 NOTE — LETTER
2022    [facility]    RE: Sejal Barroso   : 1955   Member ID: [id number]        To Whom It May Concern:      I am writing on behalf of my patient, Sejal Barroso, to document the medical necessity of Rebif for the treatment of Multiple Sclerosis. This letter provides information about the patient's medical history and diagnosis and a statement summarizing my treatment rationale.    You have previously denied Rebif, stating that Ms. Barroso has not previously tried and failed Avonex or Plegridy. I will remind you that Rebif is an FDA-approved treatment for Multiple Sclerosis. There is no medical or safety basis as to why you are denying coverage of Rebif. Ms. Barroso is currently clinically and radiologically stable on Rebif, and has been on Rebif since 2019. Therefore, to have her change disease modifying therapy at this time is not in the best interest of her health and may be unsafe. In order to provide safe and effective care for Ms. Barroso, and to provide her with the best chances of maintaining her ability to be a functioning, contributing member of society, I urge you to cover Rebif for Sejal. Please contact my office with any questions.      Sincerely,        MD Jesusita Juan MS RN Care Coordinator  [facility]  [address] [place] [id number]  Phone: [phone]  Fax: [phone]

## 2022-01-11 NOTE — TELEPHONE ENCOUNTER
PA Initiation    Medication: Rebif--Initiated  Insurance Company: [employer] - Phone [phone] Fax [phone]  Pharmacy Filling the Rx:    Filling Pharmacy Phone:    Filling Pharmacy Fax:    Start Date: 1/11/2022    KEY:[id number]

## 2022-01-17 ENCOUNTER — TELEPHONE (OUTPATIENT)
Dept: NEUROLOGY | Facility: CLINIC | Age: 67
End: 2022-01-17
Payer: COMMERCIAL

## 2022-01-17 NOTE — TELEPHONE ENCOUNTER
[facility] Call Center    Phone Message    May a detailed message be left on voicemail: yes     Reason for Call: Other: Pharmacy calling to have [employer] called [phone] to authorize   REBIF 44 MCG/0.5ML injection [[id number]]      Action Taken: Message routed to:  [facility] ([facility]): Neurology    Travel Screening: Not Applicable

## 2022-01-19 NOTE — TELEPHONE ENCOUNTER
Per call from Jaimie with [employer] dept [phone] the appeal had already been denied on 1/14/2022.  She stated the denial was faxed to [phone].  Sending to care team to verify this denial was received.

## 2022-01-24 ENCOUNTER — TELEPHONE (OUTPATIENT)
Dept: NEUROLOGY | Facility: CLINIC | Age: 67
End: 2022-01-24
Payer: COMMERCIAL

## 2022-01-24 NOTE — TELEPHONE ENCOUNTER
I have faxed in the pa denial and also the appeal denial to [employer] to attempt to get free drug through the  since [employer] will not cover.

## 2022-08-07 ENCOUNTER — HEALTH MAINTENANCE LETTER (OUTPATIENT)
Age: 67
End: 2022-08-07

## 2022-09-08 ENCOUNTER — OFFICE VISIT (OUTPATIENT)
Dept: NEUROLOGY | Facility: CLINIC | Age: 67
End: 2022-09-08
Attending: PSYCHIATRY & NEUROLOGY
Payer: COMMERCIAL

## 2022-09-08 VITALS
DIASTOLIC BLOOD PRESSURE: 83 MMHG | SYSTOLIC BLOOD PRESSURE: 130 MMHG | WEIGHT: 118.1 LBS | BODY MASS INDEX: 19.65 KG/M2 | HEART RATE: 81 BPM | OXYGEN SATURATION: 99 %

## 2022-09-08 DIAGNOSIS — R26.9 GAIT DISTURBANCE: ICD-10-CM

## 2022-09-08 DIAGNOSIS — G35 MS (MULTIPLE SCLEROSIS) (H): Primary | ICD-10-CM

## 2022-09-08 PROCEDURE — G0463 HOSPITAL OUTPT CLINIC VISIT: HCPCS

## 2022-09-08 PROCEDURE — 99215 OFFICE O/P EST HI 40 MIN: CPT | Performed by: PSYCHIATRY & NEUROLOGY

## 2022-09-08 NOTE — Clinical Note
"9/8/2022       RE: Sejal Barroso  [address]  [place] [address]     Dear Colleague,    Thank you for referring your patient, Sejal Barroso, to the [facility] [place] [facility]. Please see a copy of my visit note below.    Date of service: September 8, 2022    Referral source: Established patient    Chief complaint: Multiple sclerosis    History of the Present Illness: Ms. Sejal Barroso is a 67-year-old right-handed woman who returns to the [facility]clerosis Clinic today for a scheduled follow-up visit regarding her diagnosis of multiple sclerosis.    The patient's history is as per my previous notes.  Initial onset of symptoms of demyelinating disease was in 2008 when she developed right leg weakness and was found to have an enhancing lesion in the lower thoracic spinal cord.  Subsequently, in 2015 she had a couple of episodes of lancinating neuropathic pain, and in 2019 she developed new incoordination of the right hand and worsening weakness of the right leg associated with a new enhancing lesion in the cervical cord.  At that point, she elected to begin disease modifying therapy with the Rebif formulation of interferon beta.    Today, she denies any new episodic changes in vision, balance, strength or sensation suggestive of new relapse of multiple sclerosis since she was last seen.  She does note that fine motor dexterity in the right hand has been diminishing over time.  For example, handwriting has been more effortful over the past few months.  There is likely an element of progressive gait disturbance as well.  She notes that she \"limping\" more over time and that this becomes evident with walking shorter distances than in the past.  She did have one fall in May when she was standing up watching television, and it is not entirely clear to her what precipitated this fall.    She described a couple of " transient episodic symptoms.  Several months ago, she had a COVID-19 infection and she related that she had about a half hour of blurred vision on the day prior to being diagnosed.  During the COVID infection, her walking was worse.  These symptoms likely reflect pseudoexacerbation related to elevated body temperature.    A couple of months later, she relates that she had a couple of episodes of unsteadiness and transient blurred vision.  There was one occasion where she felt particularly fatigued and sat down on her steps.  She believes that she most likely fell asleep and noted loss of bladder control upon waking.  I told her that this particular spell was indeterminate.  She does not typically have episodes of urinary incontinence and this does raise the possibility of a seizure.  At present, I am not going to pursue additional investigations, but she should let us know if she has any recurrent symptoms in this regard.    PHYSICAL EXAMINATION:    VITAL SIGNS:  Blood pressure 130/83; pulse 81; oxygen saturation 98%; weight 53.6 kg.  GENERAL:  Well-nourished woman who presents to the examination alone, awake and alert and in no acute distress.    NEUROLOGIC EXAMINATION:     CRANIAL NERVES:  Visual fields are full to confrontation.  Extraocular movements are intact with no internuclear ophthalmoplegia.  Facial strength is normal.  Palate elevation and tongue protrusion are normal.  POWER:  Strength is within normal limits in proximal and distal muscles in the upper limbs.  In the lower limbs, there is weakness in an upper motor neuron pattern in the right lower limb with right hip flexors grading 4 and right hamstrings and anterior tibialis 4+.  REFLEXES: Reflexes are increased on the right, particularly in the leg.  MOTOR/CEREBELLAR:  There is no appendicular ataxia on finger-to-nose testing.  Rapid alternating movements are within normal limits in hands and fingers.  There is no pronator drift in the arms.  GAIT:   The patient is able to ambulate on a flat, level surface with no gross loss of postural stability.  She does, however, have an evident mild to moderate right foot drop.    Assessment/plan:     1.  Multiple sclerosis  The patient remains clinically stable with no evidence of active inflammatory demyelination clinically on current disease modifying therapy with the Rebif formulation of interferon beta.  She will continue this treatment.      I will check routine laboratory studies for monitoring of this medication today to include complete blood counts with differential and hepatic panel.      I am going to see her back in 1 year with MRI scans of the brain and cervical spine performed prior to that visit to continue to monitor the radiologic stability of her condition.    2.  Gait disturbance  I discussed with the patient that overall her history is suggestive of a progressive element of the MS disease process.  This is due to gradual degeneration of underlying axonal nerve fibers in the areas where the myelin insulation has been damaged previously.  In general, it is not clear that there is any inflammatory component to this process, and progressive MS symptoms in the absence of clinical or radiologic evidence of active inflammation do not appear to respond to disease-modifying therapies.  Unfortunately, at present, there are no established neuroprotective therapies.    The good news is that progressive symptoms in MS are typically very slow to develop over time and in many cases will eventually plateau.      I am concerned by her report of a recent fall and we discussed a physical therapy referral for evaluation and management of gait disturbance.  She would like to pursue this and I did place the order for this today.    I spent a total of 47 minutes on patient care activities related to this encounter on the date of service, including time spent in reviewing the chart, obtaining history and examination from, and  in counseling the patient.    Marcelino Salazar MD        D: 2022   T: 2022   MT: GERARDO    Name:     JOHN PALMER  MRN:      [id number]        Account:      [id number]   :      1955           Service Date: 2022       Document: [id number]      Again, thank you for allowing me to participate in the care of your patient.      Sincerely,    Marcelino Salazar MD

## 2022-09-08 NOTE — LETTER
"9/8/2022      RE: Sejal Barroso  [address]  [place] [address]     Referral source: Established patient    Chief complaint: Multiple sclerosis    History of the Present Illness: Ms. Sejal Barroso is a 67-year-old right-handed woman who returns to the [facility]clerosis [facility] today for a scheduled follow-up visit regarding her diagnosis of multiple sclerosis.    The patient's history is as per my previous notes.  Initial onset of symptoms of demyelinating disease was in 2008 when she developed right leg weakness and was found to have an enhancing lesion in the lower thoracic spinal cord.  Subsequently, in 2015 she had a couple of episodes of lancinating neuropathic pain, and in 2019 she developed new incoordination of the right hand and worsening weakness of the right leg associated with a new enhancing lesion in the cervical cord.  At that point, she elected to begin disease modifying therapy with the Rebif formulation of interferon beta.    Today, she denies any new episodic changes in vision, balance, strength or sensation suggestive of new relapse of multiple sclerosis since she was last seen.  She does note that fine motor dexterity in the right hand has been diminishing over time.  For example, handwriting has been more effortful over the past few months.  There is likely an element of progressive gait disturbance as well.  She notes that she \"limping\" more over time and that this becomes evident with walking shorter distances than in the past.  She did have one fall in May when she was standing up watching television, and it is not entirely clear to her what precipitated this fall.    She described a couple of transient episodic symptoms.  Several months ago, she had a COVID-19 infection and she related that she had about a half hour of blurred vision on the day prior to being diagnosed.  During the COVID infection, her walking was worse.  These symptoms likely reflect pseudoexacerbation " related to elevated body temperature.    A couple of months later, she relates that she had a couple of episodes of unsteadiness and transient blurred vision.  There was one occasion where she felt particularly fatigued and sat down on her steps.  She believes that she most likely fell asleep and noted loss of bladder control upon waking.  I told her that this particular spell was indeterminate.  She does not typically have episodes of urinary incontinence and this does raise the possibility of a seizure.  At present, I am not going to pursue additional investigations, but she should let us know if she has any recurrent symptoms in this regard.    PHYSICAL EXAMINATION:    VITAL SIGNS:  Blood pressure 130/83; pulse 81; oxygen saturation 98%; weight 53.6 kg.  GENERAL:  Well-nourished woman who presents to the examination alone, awake and alert and in no acute distress.    NEUROLOGIC EXAMINATION:     CRANIAL NERVES:  Visual fields are full to confrontation.  Extraocular movements are intact with no internuclear ophthalmoplegia.  Facial strength is normal.  Palate elevation and tongue protrusion are normal.  POWER:  Strength is within normal limits in proximal and distal muscles in the upper limbs.  In the lower limbs, there is weakness in an upper motor neuron pattern in the right lower limb with right hip flexors grading 4 and right hamstrings and anterior tibialis 4+.  REFLEXES: Reflexes are increased on the right, particularly in the leg.  MOTOR/CEREBELLAR:  There is no appendicular ataxia on finger-to-nose testing.  Rapid alternating movements are within normal limits in hands and fingers.  There is no pronator drift in the arms.  GAIT:  The patient is able to ambulate on a flat, level surface with no gross loss of postural stability.  She does, however, have an evident mild to moderate right foot drop.    Assessment/plan:     1.  Multiple sclerosis  The patient remains clinically stable with no evidence of active  inflammatory demyelination clinically on current disease modifying therapy with the Rebif formulation of interferon beta.  She will continue this treatment.      I will check routine laboratory studies for monitoring of this medication today to include complete blood counts with differential and hepatic panel.      I am going to see her back in 1 year with MRI scans of the brain and cervical spine performed prior to that visit to continue to monitor the radiologic stability of her condition.    2.  Gait disturbance  I discussed with the patient that overall her history is suggestive of a progressive element of the MS disease process.  This is due to gradual degeneration of underlying axonal nerve fibers in the areas where the myelin insulation has been damaged previously.  In general, it is not clear that there is any inflammatory component to this process, and progressive MS symptoms in the absence of clinical or radiologic evidence of active inflammation do not appear to respond to disease-modifying therapies.  Unfortunately, at present, there are no established neuroprotective therapies.    The good news is that progressive symptoms in MS are typically very slow to develop over time and in many cases will eventually plateau.      I am concerned by her report of a recent fall and we discussed a physical therapy referral for evaluation and management of gait disturbance.  She would like to pursue this and I did place the order for this today.    I spent a total of 47 minutes on patient care activities related to this encounter on the date of service,        including time spent in reviewing the chart, obtaining history and examination from, and in counseling the patient.    Marcelino Salazar MD   of Neurology  [facility]    Cc:  Patient

## 2022-09-08 NOTE — PATIENT INSTRUCTIONS
We will refer you to physical therapy for evaluation and management of gait disturbance    2.   Continue Rebif    3.   Blood tests today    4.  Return to clinic in one year with MRI scans of the brain and cervical spine prior to visit

## 2022-09-16 NOTE — PROGRESS NOTES
"Date of service: September 8, 2022    Referral source: Established patient    Chief complaint: Multiple sclerosis    History of the Present Illness: Ms. Sejal Barroso is a 67-year-old right-handed woman who returns to the [facility] Sclerosis Clinic today for a scheduled follow-up visit regarding her diagnosis of multiple sclerosis.    The patient's history is as per my previous notes.  Initial onset of symptoms of demyelinating disease was in 2008 when she developed right leg weakness and was found to have an enhancing lesion in the lower thoracic spinal cord.  Subsequently, in 2015 she had a couple of episodes of lancinating neuropathic pain, and in 2019 she developed new incoordination of the right hand and worsening weakness of the right leg associated with a new enhancing lesion in the cervical cord.  At that point, she elected to begin disease modifying therapy with the Rebif formulation of interferon beta.    Today, she denies any new episodic changes in vision, balance, strength or sensation suggestive of new relapse of multiple sclerosis since she was last seen.  She does note that fine motor dexterity in the right hand has been diminishing over time.  For example, handwriting has been more effortful over the past few months.  There is likely an element of progressive gait disturbance as well.  She notes that she \"limping\" more over time and that this becomes evident with walking shorter distances than in the past.  She did have one fall in May when she was standing up watching television, and it is not entirely clear to her what precipitated this fall.    She described a couple of transient episodic symptoms.  Several months ago, she had a COVID-19 infection and she related that she had about a half hour of blurred vision on the day prior to being diagnosed.  During the COVID infection, her walking was worse.  These symptoms likely reflect pseudoexacerbation related to elevated body temperature.    A couple " of months later, she relates that she had a couple of episodes of unsteadiness and transient blurred vision.  There was one occasion where she felt particularly fatigued and sat down on her steps.  She believes that she most likely fell asleep and noted loss of bladder control upon waking.  I told her that this particular spell was indeterminate.  She does not typically have episodes of urinary incontinence and this does raise the possibility of a seizure.  At present, I am not going to pursue additional investigations, but she should let us know if she has any recurrent symptoms in this regard.    PHYSICAL EXAMINATION:    VITAL SIGNS:  Blood pressure 130/83; pulse 81; oxygen saturation 98%; weight 53.6 kg.  GENERAL:  Well-nourished woman who presents to the examination alone, awake and alert and in no acute distress.    NEUROLOGIC EXAMINATION:     CRANIAL NERVES:  Visual fields are full to confrontation.  Extraocular movements are intact with no internuclear ophthalmoplegia.  Facial strength is normal.  Palate elevation and tongue protrusion are normal.  POWER:  Strength is within normal limits in proximal and distal muscles in the upper limbs.  In the lower limbs, there is weakness in an upper motor neuron pattern in the right lower limb with right hip flexors grading 4 and right hamstrings and anterior tibialis 4+.  REFLEXES: Reflexes are increased on the right, particularly in the leg.  MOTOR/CEREBELLAR:  There is no appendicular ataxia on finger-to-nose testing.  Rapid alternating movements are within normal limits in hands and fingers.  There is no pronator drift in the arms.  GAIT:  The patient is able to ambulate on a flat, level surface with no gross loss of postural stability.  She does, however, have an evident mild to moderate right foot drop.    Assessment/plan:     1.  Multiple sclerosis  The patient remains clinically stable with no evidence of active inflammatory demyelination clinically on current  disease modifying therapy with the Rebif formulation of interferon beta.  She will continue this treatment.      I will check routine laboratory studies for monitoring of this medication today to include complete blood counts with differential and hepatic panel.      I am going to see her back in 1 year with MRI scans of the brain and cervical spine performed prior to that visit to continue to monitor the radiologic stability of her condition.    2.  Gait disturbance  I discussed with the patient that overall her history is suggestive of a progressive element of the MS disease process.  This is due to gradual degeneration of underlying axonal nerve fibers in the areas where the myelin insulation has been damaged previously.  In general, it is not clear that there is any inflammatory component to this process, and progressive MS symptoms in the absence of clinical or radiologic evidence of active inflammation do not appear to respond to disease-modifying therapies.  Unfortunately, at present, there are no established neuroprotective therapies.    The good news is that progressive symptoms in MS are typically very slow to develop over time and in many cases will eventually plateau.      I am concerned by her report of a recent fall and we discussed a physical therapy referral for evaluation and management of gait disturbance.  She would like to pursue this and I did place the order for this today.    I spent a total of 47 minutes on patient care activities related to this encounter on the date of service, including time spent in reviewing the chart, obtaining history and examination from, and in counseling the patient.    Marcelino Salazar MD        D: 2022   T: 2022   MT: GERARDO    Name:     JOHN PALMER  MRN:      [id number]        Account:      [id number]   :      1955           Service Date: 2022       Document: [id number]

## 2022-10-13 ENCOUNTER — LAB REQUISITION (OUTPATIENT)
Dept: LAB | Facility: CLINIC | Age: 67
End: 2022-10-13

## 2022-10-13 DIAGNOSIS — Z01.419 ENCOUNTER FOR GYNECOLOGICAL EXAMINATION (GENERAL) (ROUTINE) WITHOUT ABNORMAL FINDINGS: ICD-10-CM

## 2022-10-13 LAB
CHOLEST SERPL-MCNC: 211 MG/DL
HDLC SERPL-MCNC: 47 MG/DL
LDLC SERPL CALC-MCNC: 143 MG/DL
NONHDLC SERPL-MCNC: 164 MG/DL
TRIGL SERPL-MCNC: 106 MG/DL

## 2022-10-13 PROCEDURE — 83718 ASSAY OF LIPOPROTEIN: CPT | Performed by: OBSTETRICS & GYNECOLOGY

## 2022-10-21 ENCOUNTER — LAB (OUTPATIENT)
Dept: LAB | Facility: CLINIC | Age: 67
End: 2022-10-21
Payer: COMMERCIAL

## 2022-10-21 DIAGNOSIS — G35 MS (MULTIPLE SCLEROSIS) (H): ICD-10-CM

## 2022-10-21 LAB
ALBUMIN SERPL-MCNC: 4 G/DL (ref 3.4–5)
ALP SERPL-CCNC: 66 U/L (ref 40–150)
ALT SERPL W P-5'-P-CCNC: 35 U/L (ref 0–50)
AST SERPL W P-5'-P-CCNC: 28 U/L (ref 0–45)
BASOPHILS # BLD AUTO: 0 10E3/UL (ref 0–0.2)
BASOPHILS NFR BLD AUTO: 0 %
BILIRUB DIRECT SERPL-MCNC: 0.1 MG/DL (ref 0–0.2)
BILIRUB SERPL-MCNC: 0.5 MG/DL (ref 0.2–1.3)
EOSINOPHIL # BLD AUTO: 0 10E3/UL (ref 0–0.7)
EOSINOPHIL NFR BLD AUTO: 0 %
ERYTHROCYTE [DISTWIDTH] IN BLOOD BY AUTOMATED COUNT: 12.3 % (ref 10–15)
HCT VFR BLD AUTO: 36.9 % (ref 35–47)
HGB BLD-MCNC: 12.9 G/DL (ref 11.7–15.7)
LYMPHOCYTES # BLD AUTO: 1.4 10E3/UL (ref 0.8–5.3)
LYMPHOCYTES NFR BLD AUTO: 31 %
MCH RBC QN AUTO: 31.5 PG (ref 26.5–33)
MCHC RBC AUTO-ENTMCNC: 35 G/DL (ref 31.5–36.5)
MCV RBC AUTO: 90 FL (ref 78–100)
MONOCYTES # BLD AUTO: 0.6 10E3/UL (ref 0–1.3)
MONOCYTES NFR BLD AUTO: 13 %
NEUTROPHILS # BLD AUTO: 2.5 10E3/UL (ref 1.6–8.3)
NEUTROPHILS NFR BLD AUTO: 56 %
PLATELET # BLD AUTO: 281 10E3/UL (ref 150–450)
PROT SERPL-MCNC: 7.6 G/DL (ref 6.8–8.8)
RBC # BLD AUTO: 4.09 10E6/UL (ref 3.8–5.2)
WBC # BLD AUTO: 4.5 10E3/UL (ref 4–11)

## 2022-10-21 PROCEDURE — 36415 COLL VENOUS BLD VENIPUNCTURE: CPT

## 2022-10-21 PROCEDURE — 85025 COMPLETE CBC W/AUTO DIFF WBC: CPT

## 2022-10-21 PROCEDURE — 80076 HEPATIC FUNCTION PANEL: CPT

## 2022-10-23 ENCOUNTER — HEALTH MAINTENANCE LETTER (OUTPATIENT)
Age: 67
End: 2022-10-23

## 2022-12-15 DIAGNOSIS — G35 MULTIPLE SCLEROSIS (H): Primary | ICD-10-CM

## 2022-12-15 RX ORDER — INTERFERON BETA-1A 44 UG/.5ML
INJECTION, SOLUTION SUBCUTANEOUS
Qty: 6 ML | Refills: 11 | Status: SHIPPED | OUTPATIENT
Start: 2022-12-15 | End: 2024-01-11

## 2022-12-15 NOTE — TELEPHONE ENCOUNTER
Received refill request for Rebif Rebidose from [facility]; Patient was last seen in September 2022 and has follow up appointment in September 2023 with Dr Salazar. Pt on PAP (free drug program) through [employer]. Pt is on Rebif Rebidose, not prefilled syringes. Old prescription for prefilled syringes discontinued. Rebif Rebidose refilled per MS refill protocol.     Jesusita Desai RN

## 2023-04-02 ENCOUNTER — HEALTH MAINTENANCE LETTER (OUTPATIENT)
Age: 68
End: 2023-04-02

## 2023-04-13 ENCOUNTER — TELEPHONE (OUTPATIENT)
Dept: NEUROLOGY | Facility: CLINIC | Age: 68
End: 2023-04-13
Payer: COMMERCIAL

## 2023-04-14 NOTE — TELEPHONE ENCOUNTER
PA Initiation    Medication: Rebif Rebidose 44MCG/0.5ML syringes (PA PENDING)  Insurance Company: [employer] - Phone [phone] Fax [phone]  Pharmacy Filling the Rx: [facility] - [place], [place] - [address]  Filling Pharmacy Phone:    Filling Pharmacy Fax:    Start Date: 4/13/2023    [facility] [employer] is requesting a new PA to be submitted and fax the determination to them at: [phone]        Thank you,    Beverley Jones [facility]  Specialty Pharmacy Clinic Liaison - CardiologyNeurologyMultiple Sclerosis  [facility]  [address] [place] [address]  3rd [place] [address]  Ph: [phone] Fax: [phone]  [email or web address]    
PRIOR AUTHORIZATION DENIED    Medication: Rebif Rebidose 44MCG/0.5ML syringes (PA DENIED)    Denial Date: 4/13/2023    Denial Rational: did not meet criteria    Appeal Information: see encounter from 12/15/22 for additional details    PA was initially denied in 12/15/2022. This is a duplicate request.      
admission

## 2023-05-08 ENCOUNTER — HOSPITAL ENCOUNTER (OUTPATIENT)
Dept: MAMMOGRAPHY | Facility: CLINIC | Age: 68
Discharge: HOME | End: 2023-05-08
Attending: OBSTETRICS & GYNECOLOGY
Payer: COMMERCIAL

## 2023-05-08 ENCOUNTER — HOSPITAL ENCOUNTER (OUTPATIENT)
Dept: BONE DENSITY | Facility: CLINIC | Age: 68
Discharge: HOME | End: 2023-05-08
Attending: OBSTETRICS & GYNECOLOGY
Payer: COMMERCIAL

## 2023-05-08 DIAGNOSIS — Z12.31 ENCOUNTER FOR SCREENING MAMMOGRAM FOR MALIGNANT NEOPLASM OF BREAST: ICD-10-CM

## 2023-05-08 DIAGNOSIS — M85.80 OTHER SPECIFIED DISORDERS OF BONE DENSITY AND STRUCTURE, UNSPECIFIED SITE: ICD-10-CM

## 2023-05-08 PROCEDURE — 77080 DXA BONE DENSITY AXIAL: CPT

## 2023-05-08 PROCEDURE — 77067 SCR MAMMO BI INCL CAD: CPT

## 2023-09-03 ENCOUNTER — HOSPITAL ENCOUNTER (OUTPATIENT)
Dept: MRI IMAGING | Facility: CLINIC | Age: 68
Discharge: HOME | End: 2023-09-03
Attending: PSYCHIATRY & NEUROLOGY
Payer: COMMERCIAL

## 2023-09-03 DIAGNOSIS — G35 MS (MULTIPLE SCLEROSIS) (H): ICD-10-CM

## 2023-09-03 PROCEDURE — 72156 MRI NECK SPINE W/O & W/DYE: CPT

## 2023-09-03 PROCEDURE — A9585 GADOBUTROL INJECTION: HCPCS | Performed by: PSYCHIATRY & NEUROLOGY

## 2023-09-03 PROCEDURE — 255N000002 HC RX 255 OP 636: Performed by: PSYCHIATRY & NEUROLOGY

## 2023-09-03 PROCEDURE — 70553 MRI BRAIN STEM W/O & W/DYE: CPT

## 2023-09-03 RX ORDER — GADOBUTROL 604.72 MG/ML
7 INJECTION INTRAVENOUS ONCE
Status: COMPLETED | OUTPATIENT
Start: 2023-09-03 | End: 2023-09-03

## 2023-09-03 RX ADMIN — GADOBUTROL 7 ML: 604.72 INJECTION INTRAVENOUS at 14:46

## 2023-09-07 ENCOUNTER — OFFICE VISIT (OUTPATIENT)
Dept: NEUROLOGY | Facility: CLINIC | Age: 68
End: 2023-09-07
Attending: PSYCHIATRY & NEUROLOGY
Payer: COMMERCIAL

## 2023-09-07 VITALS
HEART RATE: 70 BPM | DIASTOLIC BLOOD PRESSURE: 81 MMHG | BODY MASS INDEX: 19.39 KG/M2 | WEIGHT: 116.5 LBS | SYSTOLIC BLOOD PRESSURE: 126 MMHG | OXYGEN SATURATION: 100 %

## 2023-09-07 DIAGNOSIS — R26.9 GAIT DISTURBANCE: ICD-10-CM

## 2023-09-07 DIAGNOSIS — G35 MS (MULTIPLE SCLEROSIS) (H): Primary | ICD-10-CM

## 2023-09-07 PROCEDURE — 99214 OFFICE O/P EST MOD 30 MIN: CPT | Performed by: PSYCHIATRY & NEUROLOGY

## 2023-09-07 ASSESSMENT — PAIN SCALES - GENERAL: PAINLEVEL: NO PAIN (0)

## 2023-09-07 NOTE — NURSING NOTE
Chief Complaint   Patient presents with    MS    RECHECK     Annual MS follow up      Vitals were taken and medications were reconciled.   Kojo Echavarria, EMT  1:28 PM

## 2023-09-07 NOTE — LETTER
9/7/2023      RE: Sejal Barroso  [address]  [place] [address]     Referral source: Established patient    Chief complaint: Multiple sclerosis    History of the Present Illness: Ms. Sejal Barroso is a 68 year-old woman who returns to the Multiple Sclerosis Clinic today for a scheduled follow-up visit after earlier MRI scans of the brain and cervical spine.     The patient's history is as per my previous notes.  Initial onset of symptoms of demyelinating disease was in 2008 when she developed right leg weakness and was found to have an enhancing lesion in the lower thoracic spinal cord.  Subsequently, in 2015 she had a couple of episodes of lancinating neuropathic pain, and in 2019 she developed new incoordination of the right hand and worsening weakness of the right leg associated with a new enhancing lesion in the cervical cord.  At that point, she elected to begin disease modifying therapy with the Rebif formulation of interferon beta. She remains on Rebif up until the present time.    Today, she continues to deny any new episodic changes in vision, balance, strength or sensation suggestive of new relapse of multiple sclerosis since her last visit.    She indicates that walking has been gradually worse over the past 3 years. In particular, she has noticed fatigable weakness in the right leg. It has become harder to go down stairs. She has fallen once over the past year.    She also notes some worsening of fine motor function in the right hand, particularly in that her handwriting is worse.    PHYSICAL EXAMINATION:  VITAL SIGNS: Blood pressure 126/81; pulse 70; oxygen saturation 100%; weight 52.8 kg.  GENERAL: Well-nourished woman who presents to the examination alone, awake and alert and in no acute distress.    Investigations:  I reviewed images of MRI scan of the brain and cervical spine performed earlier on 9/03/2023 , and the following is my independent interpretation of those images.    There  is no abnormal enhancement with gadolinium contrast throughout the brain and cord parenchyma. Cervical MRI demonstrates T2 hyperintense focus in the right side of the cord at C2-3, unchanged from previous. Likewise, MRI scan of the brain continues to show T2 hyperintense foci in the deep white matter of the cerebral hemispheres, as well as in periventricular and juxtacortical locations, also unchanged from 7/22/2021. No new lesions are seen.    Assessment/plan:    1. Multiple sclerosis  There is no clinical or radiologic evidence of active inflammatory demyelination since she began disease modifying therapy with Rebif.    I discussed with the patient that her worsening right sided symptoms are attributable to slowly progressive injury to underlying axonal nerve fibers  in the previously noted lesion in the right hemicord. Unfortunately, there is no evidence that any currently available therapy is helpful for slowing symptoms of progressive MS in the absence of any evidence of active inflammation.    Currently, she will remain on treatment with Rebif. I will obtain routine laboratory studies for monitoring of this medication today, to include complete blood counts with differential, hepatic panel, and thyroid stimulating hormone.    I will see her back in one year for a review, or sooner if she has any new symptoms that are of concern to her.    2. Gait disturbance  Presently, she appears to be compensating adequately for her gait symptoms. I counseled her on measures to reduce risk of falls and associated injury from same, including using handrails at all times when ascending or descending stairs and exercising particular caution in winter weather conditions.    I spent a total of 31 minutes on patient care activities related to this encounter on the date of service, including time spent in reviewing the chart, obtaining history from, and in counseling the patient.    Marcelino Salazar MD    of Neurology  [facility]    Cc:  Sami Bishop MD (PCP)  Patient

## 2023-09-07 NOTE — Clinical Note
9/7/2023       RE: Sejal Barroso  [address] [place] [address]     Dear Colleague,    Thank you for referring your patient, Sejal Barroso, to the [facility] [place] at [facility]. Please see a copy of my visit note below.    Referral source: Established patient    Chief complaint: Multiple sclerosis    History of the Present Illness: Ms. Sejal Barroso is a 68 year-old woman who returns to the Multiple Sclerosis Clinic today for a scheduled follow-up visit after earlier MRI scans of the brain and cervical spine.     The patient's history is as per my previous notes.  Initial onset of symptoms of demyelinating disease was in 2008 when she developed right leg weakness and was found to have an enhancing lesion in the lower thoracic spinal cord.  Subsequently, in 2015 she had a couple of episodes of lancinating neuropathic pain, and in 2019 she developed new incoordination of the right hand and worsening weakness of the right leg associated with a new enhancing lesion in the cervical cord.  At that point, she elected to begin disease modifying therapy with the Rebif formulation of interferon beta. She remains on Rebif up until the present time.    Today, she continues to deny any new episodic changes in vision, balance, strength or sensation suggestive of new relapse of multiple sclerosis since her last visit.    She indicates that walking has been gradually worse over the past 3 years. In particular, she has noticed fatigable weakness in the right leg. It has become harder to go down stairs. She has fallen once over the past year.    She also notes some worsening of fine motor function in the right hand, particularly in that her handwriting is worse.    PHYSICAL EXAMINATION:  VITAL SIGNS: Blood pressure 126/81; pulse 70; oxygen saturation 100%; weight 52.8 kg.  GENERAL: Well-nourished woman who presents to the examination  alone, awake and alert and in no acute distress.    Investigations:  I reviewed images of MRI scan of the brain and cervical spine performed earlier on 9/03/2023 , and the following is my independent interpretation of those images.    There is no abnormal enhancement with gadolinium contrast throughout the brain and cord parenchyma. Cervical MRI demonstrates T2 hyperintense focus in the right side of the cord at C2-3, unchanged from previous. Likewise, MRI scan of the brain continues to show T2 hyperintense foci in the deep white matter of the cerebral hemispheres, as well as in periventricular and juxtacortical locations, also unchanged from 7/22/2021. No new lesions are seen.    Assessment/plan:    1. Multiple sclerosis  There is no clinical or radiologic evidence of active inflammatory demyelination since she began disease modifying therapy with Rebif.    I discussed with the patient that her worsening right sided symptoms are attributable to slowly progressive injury to underlying axonal nerve fibers  in the previously noted lesion in the right hemicord. Unfortunately, there is no evidence that any currently available therapy is helpful for slowing symptoms of progressive MS in the absence of any evidence of active inflammation.    Currently, she will remain on treatment with Rebif. I will obtain routine laboratory studies for monitoring of this medication today, to include complete blood counts with differential, hepatic panel, and thyroid stimulating hormone.    I will see her back in one year for a review, or sooner if she has any new symptoms that are of concern to her.    2. Gait disturbance  Presently, she appears to be compensating adequately for her gait symptoms. I counseled her on measures to reduce risk of falls and associated injury from same, including using handrails at all times when ascending or descending stairs and exercising particular caution in winter weather conditions.    I spent a total  of 31 minutes on patient care activities related to this encounter on the date of service, including time spent in reviewing the chart, obtaining history from, and in counseling the patient.      Again, thank you for allowing me to participate in the care of your patient.      Sincerely,    Marcelino Salazar MD

## 2023-09-20 ENCOUNTER — LAB (OUTPATIENT)
Dept: LAB | Facility: CLINIC | Age: 68
End: 2023-09-20
Payer: COMMERCIAL

## 2023-09-20 DIAGNOSIS — G35 MS (MULTIPLE SCLEROSIS) (H): ICD-10-CM

## 2023-09-20 LAB
ALBUMIN SERPL BCG-MCNC: 4.5 G/DL (ref 3.5–5.2)
ALP SERPL-CCNC: 62 U/L (ref 35–104)
ALT SERPL W P-5'-P-CCNC: 27 U/L (ref 0–50)
AST SERPL W P-5'-P-CCNC: 37 U/L (ref 0–45)
BASOPHILS # BLD AUTO: 0 10E3/UL (ref 0–0.2)
BASOPHILS NFR BLD AUTO: 0 %
BILIRUB DIRECT SERPL-MCNC: <0.2 MG/DL (ref 0–0.3)
BILIRUB SERPL-MCNC: 0.4 MG/DL
EOSINOPHIL # BLD AUTO: 0 10E3/UL (ref 0–0.7)
EOSINOPHIL NFR BLD AUTO: 0 %
ERYTHROCYTE [DISTWIDTH] IN BLOOD BY AUTOMATED COUNT: 11.8 % (ref 10–15)
HCT VFR BLD AUTO: 38.1 % (ref 35–47)
HGB BLD-MCNC: 12.9 G/DL (ref 11.7–15.7)
IMM GRANULOCYTES # BLD: 0 10E3/UL
IMM GRANULOCYTES NFR BLD: 0 %
LYMPHOCYTES # BLD AUTO: 1.1 10E3/UL (ref 0.8–5.3)
LYMPHOCYTES NFR BLD AUTO: 36 %
MCH RBC QN AUTO: 30.4 PG (ref 26.5–33)
MCHC RBC AUTO-ENTMCNC: 33.9 G/DL (ref 31.5–36.5)
MCV RBC AUTO: 90 FL (ref 78–100)
MONOCYTES # BLD AUTO: 0.5 10E3/UL (ref 0–1.3)
MONOCYTES NFR BLD AUTO: 15 %
NEUTROPHILS # BLD AUTO: 1.4 10E3/UL (ref 1.6–8.3)
NEUTROPHILS NFR BLD AUTO: 48 %
PLATELET # BLD AUTO: 227 10E3/UL (ref 150–450)
PROT SERPL-MCNC: 7.5 G/DL (ref 6.4–8.3)
RBC # BLD AUTO: 4.24 10E6/UL (ref 3.8–5.2)
TSH SERPL DL<=0.005 MIU/L-ACNC: 3.39 UIU/ML (ref 0.3–4.2)
WBC # BLD AUTO: 3 10E3/UL (ref 4–11)

## 2023-09-20 PROCEDURE — 85025 COMPLETE CBC W/AUTO DIFF WBC: CPT

## 2023-09-20 PROCEDURE — 84443 ASSAY THYROID STIM HORMONE: CPT

## 2023-09-20 PROCEDURE — 80076 HEPATIC FUNCTION PANEL: CPT

## 2023-09-20 PROCEDURE — 36415 COLL VENOUS BLD VENIPUNCTURE: CPT

## 2023-11-12 NOTE — PROGRESS NOTES
Referral source: Established patient    Chief complaint: Multiple sclerosis    History of the Present Illness: Ms. Sejal Barroso is a 68 year-old woman who returns to the [facility] today for a scheduled follow-up visit after earlier MRI scans of the brain and cervical spine.     The patient's history is as per my previous notes.  Initial onset of symptoms of demyelinating disease was in 2008 when she developed right leg weakness and was found to have an enhancing lesion in the lower thoracic spinal cord.  Subsequently, in 2015 she had a couple of episodes of lancinating neuropathic pain, and in 2019 she developed new incoordination of the right hand and worsening weakness of the right leg associated with a new enhancing lesion in the cervical cord.  At that point, she elected to begin disease modifying therapy with the Rebif formulation of interferon beta. She remains on Rebif up until the present time.    Today, she continues to deny any new episodic changes in vision, balance, strength or sensation suggestive of new relapse of multiple sclerosis since her last visit.    She indicates that walking has been gradually worse over the past 3 years. In particular, she has noticed fatigable weakness in the right leg. It has become harder to go down stairs. She has fallen once over the past year.    She also notes some worsening of fine motor function in the right hand, particularly in that her handwriting is worse.    PHYSICAL EXAMINATION:  VITAL SIGNS: Blood pressure 126/81; pulse 70; oxygen saturation 100%; weight 52.8 kg.  GENERAL: Well-nourished woman who presents to the examination alone, awake and alert and in no acute distress.    Investigations:  I reviewed images of MRI scan of the brain and cervical spine performed earlier on 9/03/2023 , and the following is my independent interpretation of those images.    There is no abnormal enhancement with gadolinium contrast throughout the brain and cord  parenchyma. Cervical MRI demonstrates T2 hyperintense focus in the right side of the cord at C2-3, unchanged from previous. Likewise, MRI scan of the brain continues to show T2 hyperintense foci in the deep white matter of the cerebral hemispheres, as well as in periventricular and juxtacortical locations, also unchanged from 7/22/2021. No new lesions are seen.    Assessment/plan:    1. Multiple sclerosis  There is no clinical or radiologic evidence of active inflammatory demyelination since she began disease modifying therapy with Rebif.    I discussed with the patient that her worsening right sided symptoms are attributable to slowly progressive injury to underlying axonal nerve fibers  in the previously noted lesion in the right hemicord. Unfortunately, there is no evidence that any currently available therapy is helpful for slowing symptoms of progressive MS in the absence of any evidence of active inflammation.    Currently, she will remain on treatment with Rebif. I will obtain routine laboratory studies for monitoring of this medication today, to include complete blood counts with differential, hepatic panel, and thyroid stimulating hormone.    I will see her back in one year for a review, or sooner if she has any new symptoms that are of concern to her.    2. Gait disturbance  Presently, she appears to be compensating adequately for her gait symptoms. I counseled her on measures to reduce risk of falls and associated injury from same, including using handrails at all times when ascending or descending stairs and exercising particular caution in winter weather conditions.    I spent a total of 31 minutes on patient care activities related to this encounter on the date of service, including time spent in reviewing the chart, obtaining history from, and in counseling the patient.

## 2024-01-11 DIAGNOSIS — G35 MULTIPLE SCLEROSIS (H): ICD-10-CM

## 2024-01-11 RX ORDER — INTERFERON BETA-1A 44 UG/.5ML
0.5 INJECTION, SOLUTION SUBCUTANEOUS
Qty: 6 ML | Refills: 11 | Status: SHIPPED | OUTPATIENT
Start: 2024-01-12

## 2024-01-20 ENCOUNTER — HEALTH MAINTENANCE LETTER (OUTPATIENT)
Age: 69
End: 2024-01-20

## 2024-02-15 ENCOUNTER — TELEPHONE (OUTPATIENT)
Dept: NEUROLOGY | Facility: CLINIC | Age: 69
End: 2024-02-15
Payer: COMMERCIAL

## 2024-02-15 NOTE — TELEPHONE ENCOUNTER
Received request for updated prescriptions and services form from [email or web address]. Pt on Rebif Rebidose 44 mcg through [email or web address] PAP. Form prepared and placed in Dr Salazar's folder.     Jesusita Desai RN

## 2024-02-16 NOTE — TELEPHONE ENCOUNTER
Signed Rebif Prescriptions and Service Request form faxed back to [facility] at [phone].    Jesusita Desai RN

## 2024-02-29 ENCOUNTER — LAB REQUISITION (OUTPATIENT)
Dept: LAB | Facility: CLINIC | Age: 69
End: 2024-02-29

## 2024-02-29 DIAGNOSIS — E78.5 HYPERLIPIDEMIA, UNSPECIFIED: ICD-10-CM

## 2024-02-29 PROCEDURE — 80061 LIPID PANEL: CPT | Performed by: OBSTETRICS & GYNECOLOGY

## 2024-03-01 LAB
CHOLEST SERPL-MCNC: 221 MG/DL
FASTING STATUS PATIENT QL REPORTED: YES
HDLC SERPL-MCNC: 43 MG/DL
LDLC SERPL CALC-MCNC: 136 MG/DL
NONHDLC SERPL-MCNC: 178 MG/DL
TRIGL SERPL-MCNC: 211 MG/DL

## 2024-06-17 ENCOUNTER — NURSE TRIAGE (OUTPATIENT)
Dept: NEUROLOGY | Facility: CLINIC | Age: 69
End: 2024-06-17
Payer: COMMERCIAL

## 2024-06-17 NOTE — TELEPHONE ENCOUNTER
Pt wants to know if she can get Rx for Paxlovid or if VV is needed.Pt tested positive for COVID-19 with onset of symptoms Friday.   Pt doesn't have a PCP at [facility].         Reason for Disposition   [1] SEVERE RISK patient (e.g., immuno-compromised, serious lung disease, on oxygen, heart disease, bedridden, etc) AND [2] suspected COVID-19 with mild symptoms (Exception: Already seen by PCP and no new or worsening symptoms.)    Additional Information   Negative: Severe difficulty breathing (struggling for each breath, unable to speak or cry, making grunting noises with each breath, severe retractions) (Triage tip: Listen to the child's breathing.)   Negative: Slow, shallow, weak breathing   Negative: [1] Bluish (or gray) lips or face now AND [2] persists when not coughing   Negative: Difficult to awaken or not alert when awake (confusion)   Negative: Very weak (doesn't move or make eye contact)   Negative: Sounds like a life-threatening emergency to the triager   Negative: Low rates of COVID-19 regionally (Exception: known COVID-19 close contact)   Negative: Previous diagnosis of asthma (or RAD) OR regular use of asthma medicines for wheezing AND [2] asthma symptoms   Negative: Croup suspected (barky cough with hoarseness) OR any stridor within the last 24 hours   Negative: Runny nose from nasal allergies   Negative: [1] Headache is isolated symptom (no fever) AND [2] no known COVID-19 close contact   Negative: [1] Vomiting is isolated symptom (no fever) AND [2] no known COVID-19 close contact   Negative: [1] Diarrhea is isolated symptom (no fever) AND [2] no known COVID-19 close contact   Negative: [1] COVID-19 exposure AND [2] NO symptoms   Negative: [1] COVID-19 vaccine general reaction (fever, headache, muscle aches, fatigue) AND [2] starts within 48 hours of shot (Note: vaccine does not cause respiratory symptoms. Stay here for those symptoms.)   Negative: COVID-19 vaccine, questions about   Negative: [1]  Diagnosed with influenza within the last 2 weeks by a HCP AND [2] follow-up call   Negative: [1] Household exposure to known influenza (flu test positive) AND [2] child with influenza-like symptoms   Negative: [1] Difficulty breathing confirmed by triager BUT [2] not severe (Triage tip: Listen to the child's breathing.)   Negative: Retractions - skin between the ribs is pulling in (sinking in) with each breath   Negative: [1] Age < 12 weeks AND [2] fever 100.4 F (38.0 C) or higher rectally   Negative: SEVERE chest pain or pressure (excruciating)    Protocols used: Coronavirus (COVID-19) Diagnosed or [id number]-H-FA    RN COVID TREATMENT VISIT  06/17/24    The patient has been triaged and does not require a higher level of care.    Has the patient been seen by a primary care provider at an [facility] or [facility] within the past two years? No, therefore patient is not eligible for COVID treatment standing order.    Pt has seen neurology but triage unable to find PC within [facility]. Pt has a VV with [facility]. Triage advised pt to keep VV. Seek care at ER if severe chest pain or breathing problems at rest. Pt verbalized agreement with plan.

## 2024-06-18 ENCOUNTER — TELEPHONE (OUTPATIENT)
Dept: NEUROLOGY | Facility: CLINIC | Age: 69
End: 2024-06-18
Payer: COMMERCIAL

## 2024-06-18 NOTE — TELEPHONE ENCOUNTER
PA Initiation    Medication: REBIF REBIDOSE 44 MCG/0.5ML SC SOAJ  Insurance Company: [employer] - Phone [phone] Fax [phone]  Pharmacy Filling the Rx:    Filling Pharmacy Phone:    Filling Pharmacy Fax:    Start Date: 6/18/2024    [id number]        Received faxed PA request. Patient on free drug.

## 2024-06-18 NOTE — LETTER
6/24/2024    [facility]      RE: Sejal Barroso  [address]  [place] [address]       To Whom It May Concern:     I write on behalf of my patient, Sejal Barroso, to document the medical necessity of continued Rebif Rebidose injections for the treatment of Multiple Sclerosis. This letter provides information about the patient's medical history and diagnosis and a statement summarizing my treatment rationale.     Ms. Barroso first developed symptoms of demyelinating disease in 2008, when she experienced right leg weakness and was found to have an enhancing lesion in the lower thoracic spinal cord.  Subsequently in 2015, Ms. Barroso experienced episodes of lancinating neuropathic pain, and in 2019 she developed new incoordination of the right hand and worsening weakness of the right leg associated with a new active lesion in the cervical spinal cord. At that point, treatment with Rebif was initiated. Thankfully, Ms. Barroso's disease has remained clinically and radiologically stable since the initiation of treatment with Rebif in 2019.    You have now denied Rebif, stating that Ms. Barroso has not previously tried and failed Avonex or Plegridy. I will remind you that Rebif is an FDA-approved treatment for Multiple Sclerosis. There is no medical or safety basis as to why you are denying coverage of Rebif. Ms. Barroso has remained clinically and radiologically stable since initiating treatment with Rebif in 2019, and she is tolerating the medication well. Therefore, to have her change disease modifying therapy at this time is not in the best interest of her health and may be unsafe. In order to provide safe and effective care for Ms. Barroso, I strongly urge you to reconsider your denial of Rebif.  Please contact my office with any questions.    Sincerely,    MD Jesusita Juan, RN Care Coordinator  [facility]  [address] [place] [address]  [place] [address]  Phone [phone]  Fax [phone]

## 2024-06-19 NOTE — TELEPHONE ENCOUNTER
PRIOR AUTHORIZATION DENIED    Medication: REBIF REBIDOSE 44 MCG/0.5ML SC SOAJ  Insurance Company: [employer] - Phone [phone] Fax [phone]  Denial Date: 6/19/2024  Denial Reason(s): Need to try Avonex and Plegridy    Appeal Information:       No further action required, gets through free drug program. Faxed to [facility] [employer].

## 2024-06-20 NOTE — TELEPHONE ENCOUNTER
Health Call Center    Phone Message    May a detailed message be left on voicemail: yes     Reason for Call: Prashant from [facility] [employer] is requesting a call back as he would like to discuss an appeal for the Pt's medication denial.    Please contact Prashant at [phone]    Action Taken: Message routed to:  [facility] Neurology     Travel Screening: Not Applicable     Date of Service:

## 2024-06-20 NOTE — TELEPHONE ENCOUNTER
Spoke with Prashant from [facility], who explained that an appeal will be needed.     The pt is currently above the income limit for continued free drug, but if the appeal also is denied, they can look at her income information more closely to see if it can be adjusted. However, Prashant stated it is unlikely that her income will come down sufficiently in that process to qualify for continued PAP. Pt received last 30 day supply on 6/18.    We will start with appealing the denial.     Jesusita Desai RN

## 2024-06-25 NOTE — TELEPHONE ENCOUNTER
Medication Appeal Initiation    Medication: REBIF REBIDOSE 44 MCG/0.5ML SC SOAJ  Appeal Start Date:  6/25/2024  Insurance Company: [employer] Phone: [phone]  Insurance Fax: [phone]  Comments:   Faxed appeal letter and chart notes to  for review.    Thank you,    Beverley Jones [facility]  Specialty Pharmacy Clinic Liaison - CardiologyNeurologyMultiple [facility] [address]  [address] [place] [address]  Ph: [phone] Fax: [phone]  [email or web address]

## 2024-07-05 NOTE — TELEPHONE ENCOUNTER
MEDICATION APPEAL DENIED    Medication: REBIF REBIDOSE 44 MCG/0.5ML SC SOAJ  Insurance Company: [employer]  Denial Date: 6/29/2024  Denial Reason(s): Non-formulary  Second Level Appeal Information: N/A  Patient Notified: Yes      Appeal denial letter faxed to [facility] at [phone]        Thank you,    Beverley Jones [facility]  Specialty Pharmacy Clinic Liaison - CardiologyNeurologyMultiple Sclerosis  [facility] [address]  [address] [place] [address]  Ph: [phone] Fax: [phone]  [email or web address]

## 2024-07-30 ENCOUNTER — HOSPITAL ENCOUNTER (OUTPATIENT)
Dept: MAMMOGRAPHY | Facility: CLINIC | Age: 69
Discharge: HOME | End: 2024-07-30
Attending: FAMILY MEDICINE | Admitting: FAMILY MEDICINE
Payer: COMMERCIAL

## 2024-07-30 DIAGNOSIS — Z12.31 VISIT FOR SCREENING MAMMOGRAM: ICD-10-CM

## 2024-07-30 PROCEDURE — 77063 BREAST TOMOSYNTHESIS BI: CPT

## 2025-01-07 DIAGNOSIS — G35 MULTIPLE SCLEROSIS (H): ICD-10-CM

## 2025-01-08 ENCOUNTER — DOCUMENTATION ONLY (OUTPATIENT)
Dept: NEUROLOGY | Facility: CLINIC | Age: 70
End: 2025-01-08
Payer: COMMERCIAL

## 2025-01-08 NOTE — PROGRESS NOTES
Called patient to schedule MS follow up with Dr. Salazar, no answer left voicemail with clinic number [phone] to call back and schedule.   Kjoo Echavarria EMT January 8, 2025

## 2025-01-08 NOTE — TELEPHONE ENCOUNTER
Received refill request for Rebif. Pt overdue for follow-up with Dr Salazar. Message sent to clinic assistant to contact pt to schedule follow-up. Once pt is scheduled, we can provide refill to last until follow-up.     Jesusita Desai RN

## 2025-01-09 RX ORDER — INTERFERON BETA-1A 44 UG/.5ML
0.5 INJECTION, SOLUTION SUBCUTANEOUS
Qty: 6 ML | Refills: 0 | Status: SHIPPED | OUTPATIENT
Start: 2025-01-10

## 2025-01-09 NOTE — TELEPHONE ENCOUNTER
VM has been left for the pt and mychart message sent about need to schedule follow-up. Pending one month supply with 0 refills to Dr Salazar for approval.    Jesusita Deasi RN

## 2025-02-10 DIAGNOSIS — G35 MULTIPLE SCLEROSIS (H): ICD-10-CM

## 2025-02-10 RX ORDER — INTERFERON BETA-1A 44 UG/.5ML
0.5 INJECTION, SOLUTION SUBCUTANEOUS
Qty: 6 ML | Refills: 0 | Status: CANCELLED | OUTPATIENT
Start: 2025-02-10

## 2025-02-26 ENCOUNTER — DOCUMENTATION ONLY (OUTPATIENT)
Dept: NEUROLOGY | Facility: CLINIC | Age: 70
End: 2025-02-26
Payer: COMMERCIAL

## 2025-02-26 NOTE — PROGRESS NOTES
Called patient to schedule MS follow up with Dr. Salazar. No answer left voice mail with clinic number [phone] to call back and schedule.   Kojo Echavarria EMT February 26, 2025

## 2025-04-13 ENCOUNTER — HEALTH MAINTENANCE LETTER (OUTPATIENT)
Age: 70
End: 2025-04-13

## 2025-05-05 DIAGNOSIS — G35 MULTIPLE SCLEROSIS (H): ICD-10-CM

## 2025-05-05 RX ORDER — INTERFERON BETA-1A 44 UG/.5ML
0.5 INJECTION, SOLUTION SUBCUTANEOUS
Qty: 6 ML | Refills: 3 | Status: SHIPPED | OUTPATIENT
Start: 2025-05-05

## 2025-05-05 NOTE — TELEPHONE ENCOUNTER
[facility]    Phone Message    May a detailed message be left on voicemail: yes     Reason for Call: Medication Refill Request    Has the patient contacted the pharmacy for the refill? Yes   Name of medication being requested: Interferon Beta-1a (REBIF REBIDOSE) 44 MCG/0.5ML SOAJ   Provider who prescribed the medication: Marcelino Salazar MD    Pharmacy: [facility] - [place] [address]    Date medication is needed: Pt stated she has 5 left Pt follow up scheduled for 08/25      Action Taken: Message routed to:  [facility] Neurology     Travel Screening: Not Applicable     Date of Service:

## 2025-05-05 NOTE — TELEPHONE ENCOUNTER
Patient requesting refill of their Rebif Rebidose; Patient was last seen in Sep 2023 and has follow up appointment in August 2025 with Dr Salazar. Unable to fill per [facility] refill protocol due to length of time since last appointment. Refill to last until follow-up pended to Dr Salazar for approval.    Jesusita Desai RN

## 2025-05-15 DIAGNOSIS — G35 MS (MULTIPLE SCLEROSIS) (H): Primary | ICD-10-CM

## 2025-08-13 ENCOUNTER — DOCUMENTATION ONLY (OUTPATIENT)
Dept: NEUROLOGY | Facility: CLINIC | Age: 70
End: 2025-08-13
Payer: MEDICARE

## 2025-08-23 ENCOUNTER — HOSPITAL ENCOUNTER (OUTPATIENT)
Dept: MRI IMAGING | Facility: CLINIC | Age: 70
Discharge: HOME | End: 2025-08-23
Attending: PSYCHIATRY & NEUROLOGY | Admitting: PSYCHIATRY & NEUROLOGY
Payer: MEDICARE

## 2025-08-23 DIAGNOSIS — G35 MULTIPLE SCLEROSIS (H): ICD-10-CM

## 2025-08-23 PROCEDURE — 70553 MRI BRAIN STEM W/O & W/DYE: CPT

## 2025-08-23 PROCEDURE — 255N000002 HC RX 255 OP 636: Performed by: PSYCHIATRY & NEUROLOGY

## 2025-08-23 PROCEDURE — A9585 GADOBUTROL INJECTION: HCPCS | Performed by: PSYCHIATRY & NEUROLOGY

## 2025-08-23 PROCEDURE — 72156 MRI NECK SPINE W/O & W/DYE: CPT

## 2025-08-23 RX ORDER — GADOBUTROL 604.72 MG/ML
5 INJECTION INTRAVENOUS ONCE
Status: COMPLETED | OUTPATIENT
Start: 2025-08-23 | End: 2025-08-23

## 2025-08-23 RX ADMIN — GADOBUTROL 5 ML: 604.72 INJECTION INTRAVENOUS at 16:55

## 2025-08-25 ENCOUNTER — LAB (OUTPATIENT)
Dept: LAB | Facility: CLINIC | Age: 70
End: 2025-08-25
Payer: MEDICARE

## 2025-08-25 ENCOUNTER — OFFICE VISIT (OUTPATIENT)
Dept: NEUROLOGY | Facility: CLINIC | Age: 70
End: 2025-08-25
Attending: PSYCHIATRY & NEUROLOGY
Payer: MEDICARE

## 2025-08-25 VITALS
DIASTOLIC BLOOD PRESSURE: 88 MMHG | SYSTOLIC BLOOD PRESSURE: 137 MMHG | OXYGEN SATURATION: 99 % | HEIGHT: 63 IN | HEART RATE: 72 BPM | WEIGHT: 115.2 LBS | BODY MASS INDEX: 20.41 KG/M2

## 2025-08-25 DIAGNOSIS — G35 MS (MULTIPLE SCLEROSIS) (H): ICD-10-CM

## 2025-08-25 DIAGNOSIS — G35 MS (MULTIPLE SCLEROSIS) (H): Primary | ICD-10-CM

## 2025-08-25 DIAGNOSIS — M80.831A: ICD-10-CM

## 2025-08-25 DIAGNOSIS — M80.00XD OSTEOPOROSIS WITH CURRENT PATHOLOGICAL FRACTURE WITH ROUTINE HEALING, UNSPECIFIED OSTEOPOROSIS TYPE, SUBSEQUENT ENCOUNTER: ICD-10-CM

## 2025-08-25 LAB
ALBUMIN SERPL BCG-MCNC: 4.6 G/DL (ref 3.5–5.2)
ALP SERPL-CCNC: 76 U/L (ref 40–150)
ALT SERPL W P-5'-P-CCNC: 21 U/L (ref 0–50)
AST SERPL W P-5'-P-CCNC: 35 U/L (ref 0–45)
BASOPHILS # BLD AUTO: <0.03 10E3/UL (ref 0–0.2)
BASOPHILS NFR BLD AUTO: 0.3 %
BILIRUB SERPL-MCNC: 0.4 MG/DL
BILIRUBIN DIRECT (ROCHE PRO & PURE): 0.16 MG/DL (ref 0–0.45)
EOSINOPHIL # BLD AUTO: <0.03 10E3/UL (ref 0–0.7)
EOSINOPHIL NFR BLD AUTO: 0.3 %
ERYTHROCYTE [DISTWIDTH] IN BLOOD BY AUTOMATED COUNT: 11.9 % (ref 10–15)
HCT VFR BLD AUTO: 39.5 % (ref 35–47)
HGB BLD-MCNC: 13.6 G/DL (ref 11.7–15.7)
IMM GRANULOCYTES # BLD: <0.03 10E3/UL
IMM GRANULOCYTES NFR BLD: 0.3 %
LYMPHOCYTES # BLD AUTO: 0.88 10E3/UL (ref 0.8–5.3)
LYMPHOCYTES NFR BLD AUTO: 26.4 %
MCH RBC QN AUTO: 31.3 PG (ref 26.5–33)
MCHC RBC AUTO-ENTMCNC: 34.4 G/DL (ref 31.5–36.5)
MCV RBC AUTO: 90.8 FL (ref 78–100)
MONOCYTES # BLD AUTO: 0.42 10E3/UL (ref 0–1.3)
MONOCYTES NFR BLD AUTO: 12.6 %
NEUTROPHILS # BLD AUTO: 2 10E3/UL (ref 1.6–8.3)
NEUTROPHILS NFR BLD AUTO: 60.1 %
NRBC # BLD AUTO: <0.03 10E3/UL
NRBC BLD AUTO-RTO: 0 /100
PLATELET # BLD AUTO: 247 10E3/UL (ref 150–450)
PROT SERPL-MCNC: 7.8 G/DL (ref 6.4–8.3)
RBC # BLD AUTO: 4.35 10E6/UL (ref 3.8–5.2)
VIT D+METAB SERPL-MCNC: 32 NG/ML (ref 20–50)
WBC # BLD AUTO: 3.33 10E3/UL (ref 4–11)

## 2025-08-25 PROCEDURE — 99000 SPECIMEN HANDLING OFFICE-LAB: CPT | Performed by: PATHOLOGY

## 2025-08-25 PROCEDURE — 85025 COMPLETE CBC W/AUTO DIFF WBC: CPT | Performed by: PATHOLOGY

## 2025-08-25 PROCEDURE — 36415 COLL VENOUS BLD VENIPUNCTURE: CPT | Performed by: PATHOLOGY

## 2025-08-25 PROCEDURE — 80076 HEPATIC FUNCTION PANEL: CPT | Performed by: PATHOLOGY

## 2025-08-25 PROCEDURE — 82306 VITAMIN D 25 HYDROXY: CPT | Performed by: PSYCHIATRY & NEUROLOGY

## 2025-08-25 ASSESSMENT — PAIN SCALES - GENERAL: PAINLEVEL_OUTOF10: NO PAIN (0)
